# Patient Record
Sex: FEMALE | Race: WHITE | Employment: UNEMPLOYED | URBAN - METROPOLITAN AREA
[De-identification: names, ages, dates, MRNs, and addresses within clinical notes are randomized per-mention and may not be internally consistent; named-entity substitution may affect disease eponyms.]

---

## 2018-09-17 ENCOUNTER — TELEPHONE (OUTPATIENT)
Dept: CASE MANAGEMENT | Age: 58
End: 2018-09-17

## 2018-10-03 NOTE — LETTER
Date: 5/21/2025  Patient name: Zaria Monae  YOB: 1960  Medical Record Number: [id number]  Primary Coverage: Payor: [employer] / Plan: [employer] EXCHANGE / Product Type: *No Product type* /   Secondary Coverage:   Insurance ID: [id number]  Patient Address: [address] [place] [id number]  Telephone Information:   Home Phone [phone]   Mobile [phone]         Encounter Date: 5/21/2025  Provider: Kassidy David MD  Diagnosis:     ICD-10-CM   1. Non-pressure chronic ulcer of right ankle with fat layer exposed (HCC)  L97.312   2. Idiopathic chronic venous hypertension of right lower extremity with ulcer and inflammation (HCC)  I87.331   3. Right leg swelling  M79.89   4. Sloughing of wound  R23.8   5. Chronic venous hypertension involving both sides  I87.303   6. Delayed wound healing  T14.8XXD       Progress Note:  [facility] PROGRESS NOTE  KASSIDY DAVID MD  5/21/2025    Chief Complaint:   Chief Complaint   Patient presents with    Wound Recheck     Arrives to wound care follow-up with RLE in unna wrap. Reports that pain has been improving..       HPI:   Subjective   Zaria Monae is a 65 year old female coming in for a follow-up visit.    HPI    Wound stable  She is v anxious due to pain   Lot of pain after last visit .   Trying to elevate as much as possible - however has a standing job.     Tolerating wrap OK.   No s/o infection.     Review of Systems  Negative except HPI   Denies chest pain / SOB / palpitations  Denies fever.     Allergies  Allergies[1]    Current Meds:  Current Medications[2]      EXAM:     Objective   Objective    Physical Exam    Vital Signs  Vitals:    05/21/25 0700   BP: 141/80   Pulse: 87   Resp: 16   Temp: 97.6 °F (36.4 °C)       Wound Assessment  Wound 05/14/25 #1 Right Medial Ankle Ankle Right;Medial (Active)   Date First Assessed/Time First Assessed: 05/14/25 0907    Wound Number (Wound Clinic Only): #1 Right Medial Ankle   Primary Wound Type: Venous Ulcer  Location: Ankle  Wound Location Orientation: Right;Medial      Assessments 5/14/2025  9:12 AM 5/21/2025  8:10 AM   Wound Image        Drainage Amount Large Moderate   Drainage Description Serosanguineous Serosanguineous;Yellow   Treatments Compression (unna boot 30-40mmhg) --   Wound Length (cm) 2.9 cm 4 cm   Wound Width (cm) 2.5 cm 2.1 cm   Wound Surface Area (cm^2) 5.69 cm^2 6.6 cm^2   Wound Depth (cm) 0.1 cm 0.1 cm   Wound Volume (cm^3) 0.38 cm^3 0.44 cm^3   Wound Healing % -- -16   Margins Well-defined edges Well-defined edges   Non-staged Wound Description Full thickness Full thickness   Bernardo-wound Assessment Moist;Edema Edema;Moist   Wound Granulation Tissue Red;Pink;Firm Red;Pink;Spongy   Wound Bed Granulation (%) 30 % 45 %   Wound Bed Epithelium (%) 35 % 15 %   Wound Bed Slough (%) 35 % 30 %   Wound Odor None --   Shape Bridged --   Tunneling? No No   Undermining? No No   Sinus Tracts? No No       Inactive Orders   Date Order Priority Status Authorizing Provider   05/14/25 0952 Debridement Venous Ulcer Right;Medial Ankle Routine Completed Francesco Heredia MD       Compression Wrap 05/14/25 Ankle Anterior;Right (Active)   Placement Date: 05/14/25   Location: Ankle  Wound Location Orientation: Anterior;Right      Assessments 5/14/2025 10:18 AM 5/16/2025 11:00 AM   Response to Treatment Well tolerated Well tolerated   Compression Layers Multilayer Multilayer   Compression Product Type Unna Boot (30-40mmhg) Unna Boot (30-40mmHg)   Dressing Applied Yes Yes (Triad to bernardo-wound, honey-gel, honey alginate, kerramax)   Compression Wrap Location Toes to Knee Toes to Knee   Compression Wrap Status Clean;Dry;Intact Clean;Dry;Intact       No associated orders.                ASSESSMENT AND PLAN:     Assessment    Encounter Diagnosis  1. 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 Idiopathic chronic venous hypertension of right lower extremity with  ulcer and inflammation (HCC)    3. Right leg swelling    4. Sloughing of wound    5. Chronic venous hypertension involving both sides    6. Delayed wound healing      PROCEDURES:    Defer debridement due to pain and anxiety    Compression wrap application.     PLAN OF CARE:    Serial debridements to hasten healing.  Procedure not done today due to severe pain and anxiety-patient request  Discontinue use of honey gel  Start collagen/absorptive dressings  Continue compression wraps-add on Olaru Spender   Focus on edema reduction  Low-salt diet-avoid prolonged standing  Leg elevation when sitting down.  Watch out for signs of early infection - counseled.   Plan of care discussed with patient in detail - All questions answered   Return in one week.     Patient Instructions     Return one week  Vein clinic referral    Wound Cleaning and Dressings:    Shower with protection.   Dressing changes only in the clinic   DRESSINGS: collagen / HF transfer / kerramax.   Change dressing weekly     Compression Therapy : Yes,  UNNA 30-40 mm hg with add on spandagrip    Compression Therapy Instructions:  1.   Okay to wear overnight      if comfortable.      2.  Avoid prolonged standing in one place.  It is better to have your calf muscles moving       to pump fluid out of the legs.    3.  Elevate leg(s) above the level of the heart when sitting or as much as possible.    4.  Take your diuretics as directed by your provider.  Do not skip doses or change doses      unless instructed to do so by your provider.    5. Do not get leg(s) with compression wrap wet. If wraps are too tight as indicated        By pain, numbness/tingling or discoloration of toes remove wrap completely       and call the   wound center.     There are no questions and answers to display.        Off-Loading:    Miscellaneous Instructions:  Supplement with a daily multivitamin   Low salt diet  Intense blood sugar control - Goal Blood sugar below 180 at all times  recommended.  Increase protein intake / consider protein supplements - see below  Elevate extremities at all times when sitting / laying down.    DIETARY MODIFICATIONS TO HELP WITH WOUND HEALING:    Protein: Meats, beans, eggs, milk and yogurt particularly Greek yogurt), tofu, soy nuts, soy protein products    Vitamin C: Citrus fruits and juices, strawberries, tomatoes, tomato juice, peppers, baked potatoes, spinach, broccoli, cauliflower, Venus sprouts, cabbage    Vitamin A: Dark green, leafy vegetables, orange or yellow vegetables, cantaloupe, fortified dairy products, liver, fortified cereals    Zinc: Fortified cereals, red meats, seafood    Consider Izaiah by [employer] (These are essential branch chain amino acids that help with tissue building and wound healing) and take 2 packets/day. you can order online at [employer] or [employer]    ADDITIONAL REMINDERS:    The treatment plan has been discussed at length with you and your provider. Follow all instructions carefully, it is very important. If you do not follow all instructions, you are at  risk of your wound not healing, infection, possible loss of limb and even end of life.  Please call the clinic during regular business hours ( 7:30 AM - 5:30 PM) if you notice increased bleeding, redness, warmth, pain or pus like drainage or start running a fever greater than 100.3.    For after hour emergencies, please call your primary physician or go to the nearest emergency room.        Patient/Caregiver Education: There are no barriers to learning. Medical education for above diagnosis given.   Answered all questions.    Outcome: Patient verbalizes understanding. Patient is notified to call with any questions, complications, allergies, or worsening or changing symptoms.  Patient is to call with any side effects or complications as a result of the treatments today.      DOCUMENTATION OF TIME SPENT: Code selection for this visit was based on time spent : 30 min on date of  service in preparing to see the patient, obtaining and/or reviewing separately obtained history, performing a medically appropriate examination, counseling and educating the patient/family/caregiver, ordering medications or testing, referring and communicating with other healthcare providers, documenting clinical information in the E HR, independently interpreting results and communicating results to the patient/family/caregiver and care coordination with the patient's other providers.    Followup: Return in about 1 week (around 5/28/2025) for Wound followup.      Note to Patient:  The 21st Century Cures Act makes medical notes like these available to patients in the interest of transparency. However, be advised this is a medical document and is intended as xhas-pk-sqfa communication; it is written in medical language and may appear blunt, direct, or contain abbreviations or verbiage that are unfamiliar. Medical documents are intended to carry relevant information, facts as evident, and the clinical opinion of the practitioner.    Also, please note that this report has been produced using speech recognition software and may contain errors related to that system including, but not limited to, errors in grammar, punctuation, and spelling, as well as words and phrases that possibly may have been recognized inappropriately.  If there are any questions or concerns, contact the dictating provider for clarification.      Fracnesco Waldrop MD  5/21/2025  8:51 AM                      [1] No Known Allergies  [2]   Current Outpatient Medications   Medication Sig Dispense Refill    mupirocin 2 % External Ointment Apply 1 Application topically 3 (three) times daily. (Patient not taking: Reported on 5/14/2025) 15 g 1    mupirocin 2 % External Ointment Apply to affect lesions tid (Patient not taking: Reported on 5/14/2025) 30 g 1       Weekly Wound Education Note    Teaching Provided To: Patient  Training Topics: Cleasing and  general instructions, Compression, Discharge instructions, Dressing, Edema control  Training Method: Explain/Verbal  Training Response: Patient responds and understands, Reinforcement needed        Notes: Stable. Vashe soak prior to dressing application. Betamethasone and triad paste to periwound, endoform moistened with vashe to woundbed, hydrofera transfer, kerramax, and unna boot 30-40mmhg. Reminded to elevated leg and avoid prolong standing. Return in 1 week. Pt agreeable to compression garment being ordered - ordered medivan 2 layer from [employer].      WOUND CARE SUPPLIES ORDERED BELOW THIS LINE  ____________________________________________________    Wound Information/Order:  Wound Number: 1   Product: NO DRESSINGS NEEDED, ONLY COMPRESSION GARMENT    Was a Debridement performed: Yes, Debridement type: mechanical    Compression Stockings ordered: Yes   Product type: Other Juzo Ulcer Pro 30-40mmhg size: III regular and spandagrip E roll  Frequency: daily  Duration: 90 days    Calf     Point of Measurement - Right Calf: 38        Right Calf from:: Heel  Right Calf cm:: 37.5    Ankle     Point of Measurement - Right Ankle: 10           Right Ankle from:: Heel  Right Ankle cm:: 21.6       Heel to Knee   45cm       Notes: Ordering only compression garment list above, no dressings needed. Dispense as written. Please call patient before sending out order if there is any type of copay.            yes

## 2019-06-03 ENCOUNTER — PATIENT OUTREACH (OUTPATIENT)
Dept: CASE MANAGEMENT | Age: 59
End: 2019-06-03

## 2019-06-03 NOTE — PROGRESS NOTES
Pt informed is eligible for an establish care visit with PCP and offered assistance in scheduling. Patient declined.

## 2020-02-13 ENCOUNTER — OFFICE VISIT (OUTPATIENT)
Dept: FAMILY MEDICINE CLINIC | Facility: CLINIC | Age: 60
End: 2020-02-13

## 2020-02-13 DIAGNOSIS — T25.019A: Primary | ICD-10-CM

## 2020-02-13 PROCEDURE — 99203 OFFICE O/P NEW LOW 30 MIN: CPT | Performed by: NURSE PRACTITIONER

## 2020-02-14 VITALS
SYSTOLIC BLOOD PRESSURE: 124 MMHG | HEART RATE: 77 BPM | RESPIRATION RATE: 20 BRPM | HEIGHT: 68 IN | TEMPERATURE: 98 F | DIASTOLIC BLOOD PRESSURE: 82 MMHG | WEIGHT: 178 LBS | BODY MASS INDEX: 26.98 KG/M2 | OXYGEN SATURATION: 99 %

## 2020-02-14 NOTE — PROGRESS NOTES
CHIEF COMPLAINT:   Patient presents with:  Derm Problem: sore on ankle      HPI:     Johnny Rodriguez is a 64year old female who presents with concerns of sore on ankle. Patient first noticed symptoms 2 weeks ago.   Reports is on feet a lot and has varicose vein /82   Pulse 77   Temp 98 °F (36.7 °C) (Oral)   Resp 20   Ht 68\"   Wt 178 lb (80.7 kg)   SpO2 99%   BMI 27.06 kg/m²   GENERAL: well developed, well nourished,in no apparent distress  SKIN: Lesion(s): golf size open friction abrasion located right low · Wash affected area 2-3 times daily with warm soapy water. · Wash hands before and after touching area. · Apply non-stick dressing if out and about. Open to air if at home.   · Seek care if signs of infection develop, including: increased redness, swelli · Kill germs and remove the dirt by washing the wound with warm water and soap. · Soak a minor puncture wound in warm, sudsy water for several minutes. Repeat this at least 2 times every day. Step 3.  Cover the injury  · Hold the edges of a cut together w

## 2020-03-10 ENCOUNTER — OFFICE VISIT (OUTPATIENT)
Dept: FAMILY MEDICINE CLINIC | Facility: CLINIC | Age: 60
End: 2020-03-10

## 2020-03-10 VITALS
WEIGHT: 178 LBS | HEIGHT: 68 IN | SYSTOLIC BLOOD PRESSURE: 150 MMHG | BODY MASS INDEX: 26.98 KG/M2 | DIASTOLIC BLOOD PRESSURE: 89 MMHG | HEART RATE: 85 BPM

## 2020-03-10 DIAGNOSIS — L97.909 PERIPHERAL VASCULAR DISEASE OF LOWER EXTREMITY WITH ULCERATION (HCC): Primary | ICD-10-CM

## 2020-03-10 DIAGNOSIS — I73.9 PERIPHERAL VASCULAR DISEASE OF LOWER EXTREMITY WITH ULCERATION (HCC): Primary | ICD-10-CM

## 2020-03-10 DIAGNOSIS — I83.811 VARICOSE VEINS OF LEG WITH PAIN, RIGHT: ICD-10-CM

## 2020-03-10 PROCEDURE — 99203 OFFICE O/P NEW LOW 30 MIN: CPT | Performed by: NURSE PRACTITIONER

## 2020-03-10 NOTE — PATIENT INSTRUCTIONS
Varicose Veins  Varicose veins are swollen, enlarged veins most often found in the legs. They are usually blue or purple in color and may bulge, twist, and stand out under the skin. Normally, veins return blood from the body to the heart.  The leg veins · Don't sit or stand for long periods. Change positions often. Also, move your ankles, toes and knees often. This may also help improve blood flow.   · If you are overweight, talk with your healthcare provider about setting up a weight-loss plan. Maintainin · Rinse with warm water, keeping your fingers pointing down. · Use a paper towel to dry your hands and to turn off the faucet.   Remove the used dressing  Here are suggestions for removing the dressing:  · If dressing changes cause you pain, be sure to carlee · Increased swelling or pain, or redness or swelling in the skin around the wound  · A change in the color of the wound, or if streaks develop in a direction away from the wound  · The area between any stitches opens up  · An increase in the size of the wo

## 2020-03-12 NOTE — PROGRESS NOTES
HPI   Pt presents for wound to the inner aspect of right ankle. Has h/o varicose veins. Usually wears a compression home but due to wound, it is too painful.     Has never seen a vascular surgeon  Works as a  and stands a lot at [employer] shows or Social connections:        Talks on phone: Not on file        Gets together: Not on file        Attends [employer] service: Not on file        Active member of club or organization: Not on file        Attends meetings of clubs or organizations: Not on file Discussed plan of care with pt and pt is in agreement. All questions answered. Pt to call with questions or concerns. Encouraged to sign up for My Chart if not already registered.

## 2020-03-17 ENCOUNTER — APPOINTMENT (OUTPATIENT)
Dept: WOUND CARE | Facility: HOSPITAL | Age: 60
End: 2020-03-17
Attending: NURSE PRACTITIONER
Payer: COMMERCIAL

## 2020-03-31 ENCOUNTER — APPOINTMENT (OUTPATIENT)
Dept: WOUND CARE | Facility: HOSPITAL | Age: 60
End: 2020-03-31
Attending: NURSE PRACTITIONER
Payer: COMMERCIAL

## 2020-03-31 ENCOUNTER — TELEPHONE (OUTPATIENT)
Dept: FAMILY MEDICINE CLINIC | Facility: CLINIC | Age: 60
End: 2020-03-31

## 2020-03-31 NOTE — TELEPHONE ENCOUNTER
Notified by Iva LAMBERT from wound clinic that pt has cancelled twice. Message left for pt to f/u with wound care as she has significant varicose veins and I do not want her s/s worsening.  Advised to make appt w wound care and be seen,

## 2020-04-07 ENCOUNTER — OFFICE VISIT (OUTPATIENT)
Dept: WOUND CARE | Facility: HOSPITAL | Age: 60
End: 2020-04-07
Attending: NURSE PRACTITIONER
Payer: COMMERCIAL

## 2020-04-07 DIAGNOSIS — I83.811 VARICOSE VEINS OF LEG WITH PAIN, RIGHT: ICD-10-CM

## 2020-04-07 DIAGNOSIS — L97.909 PERIPHERAL VASCULAR DISEASE OF LOWER EXTREMITY WITH ULCERATION (HCC): Primary | ICD-10-CM

## 2020-04-07 DIAGNOSIS — I73.9 PERIPHERAL VASCULAR DISEASE OF LOWER EXTREMITY WITH ULCERATION (HCC): Primary | ICD-10-CM

## 2020-04-07 PROCEDURE — 99214 OFFICE O/P EST MOD 30 MIN: CPT

## 2020-04-07 PROCEDURE — 29581 APPL MULTLAYER CMPRN SYS LEG: CPT

## 2020-04-07 NOTE — PROGRESS NOTES
Subjective    Chief Complaint  This information was obtained from the patient  The patient is new to the [facility],[address] here for an initial visit for the evaluation and management of non-healing wound(s). right leg wound    Allergies  No Known Allergies    HP Cardiovascular (Central/Peripheral): Chest Pain, Palpitations  Gastrointestinal (GI): Constipation, Nausea / Vomiting / Diarrhea (N/V/D)  Genitourinary (): Urinary Incontinence  Musculoskeletal: Decreased Activity, Joint Swelling, Muscle Weakness  Neurol Wound #1 Right, Medial Ankle is a Full Thickness Venous Ulcer and has received a status of Not Healed. Initial wound encounter measurements are 2.5cm length x 0.4cm width x 0.1cm depth, with an area of 1 sq cm and a volume of 0.1 cubic cm.  No tunneling has Normal gait. no clubbing, no cyanosis of digits and nails, prominent metatarsal head with claw toes. Bilateral lower ext with no significant deformity, no joint abnormality. no edema. Peripheral pulses intact.  varicosities legs, foot and toes with hemoside I83.013: Varicose veins of right lower extremity with ulcer of ankle        Related information in the HPI.  64year old woman with extensive varicose veins and right medial leg wound:  -chronic wound greater than 2 months  -hemosiderin staining  -no edema Reviewed and evaluated labs. Discussed the plan of care at the bedside with the patient. Reviewed hospital records. I agree and attest to the above information provided from other licensed professionals.     Plan of Care:  Patient / Wound Assessment:  - Discussed leg elevation and regular exercises to manage edema in addition to compression therapy. Discussed signs and symptoms of infection, encourage patient to assess skin daily.   Discussed moisture related skin damage and keeping skin folds clean, and CNA/CHT/CMA: Rick Hurtado  Physician / Extender: Jeris Cowden  Procedure Performed for: Wound #1 Right, Medial Ankle  Performed By: Clinician Rylie De Paz RN  Response to Treatment: Procedure was tolerated well  Compression Layers: Multi-Layer  Com

## 2020-04-14 ENCOUNTER — OFFICE VISIT (OUTPATIENT)
Dept: WOUND CARE | Facility: HOSPITAL | Age: 60
End: 2020-04-14
Attending: CLINICAL NURSE SPECIALIST
Payer: COMMERCIAL

## 2020-04-14 DIAGNOSIS — L97.909 PERIPHERAL VASCULAR DISEASE OF LOWER EXTREMITY WITH ULCERATION (HCC): Primary | ICD-10-CM

## 2020-04-14 DIAGNOSIS — I83.811 VARICOSE VEINS OF LEG WITH PAIN, RIGHT: ICD-10-CM

## 2020-04-14 DIAGNOSIS — I73.9 PERIPHERAL VASCULAR DISEASE OF LOWER EXTREMITY WITH ULCERATION (HCC): Primary | ICD-10-CM

## 2020-04-14 PROCEDURE — 29581 APPL MULTLAYER CMPRN SYS LEG: CPT

## 2020-04-14 NOTE — PROGRESS NOTES
Subjective    Chief Complaint  This information was obtained from the patient  The patient was seen today for follow up and management of difficult to heal wound(s).   4/14 - no additional concerns for todays visit    Allergies  No Known Allergies    HPI  T Psychiatric: Mental Illness, Memory Loss, Suicidal        Objective    Wound Assessment(s)  Wound #1 Right, Medial Ankle is a Full Thickness Venous Ulcer and has received a status of Not Healed. Subsequent wound encounter measurements are 2.5cm length x 0. I83.013: Varicose veins of right lower extremity with ulcer of ankle        Right medial ankle/lower leg:  -currently has two openings (compare to 3 open area last week)  -patient had anxiety and fear of pain, thus unable to debride the wound due to pain i Misc / Additional orders  Supplement with a daily multivitamin. Increase dietary protein intake. Exercise as tolerated. Decrease salt intake. Stop smoking.   S/S of infection - monitor for signs and symptoms of infection such as fever, chills, redness a Ice pack applied for comfort  Not significant for charge  Electronic Signature(s)  Signed By: Date:  Mayda Lezama 04/14/2020 8:55:45 AM  Raven FERNANDOP-BC 04/14/2020 9:30:21 AM       Entered By: Gino Robin on 04/14/2020 8:52:41 AM         Header Imag

## 2020-04-21 ENCOUNTER — OFFICE VISIT (OUTPATIENT)
Dept: WOUND CARE | Facility: HOSPITAL | Age: 60
End: 2020-04-21
Attending: CLINICAL NURSE SPECIALIST
Payer: COMMERCIAL

## 2020-04-21 DIAGNOSIS — I73.9 PERIPHERAL VASCULAR DISEASE OF LOWER EXTREMITY WITH ULCERATION (HCC): Primary | ICD-10-CM

## 2020-04-21 DIAGNOSIS — L97.909 PERIPHERAL VASCULAR DISEASE OF LOWER EXTREMITY WITH ULCERATION (HCC): Primary | ICD-10-CM

## 2020-04-21 PROCEDURE — 29581 APPL MULTLAYER CMPRN SYS LEG: CPT

## 2020-04-21 NOTE — PROGRESS NOTES
Subjective    Chief Complaint  This information was obtained from the patient  The patient was seen today for follow up and management of difficult to heal right ankle wound.     Allergies  No Known Allergies    HPI  This information was obtained from the p Wound #1 Right, Medial Ankle is a Full Thickness Venous Ulcer and has received a status of Not Healed. Subsequent wound encounter measurements are 2.4cm length x 0.4cm width x 0.1cm depth, with an area of 0.96 sq cm and a volume of 0.096 cubic cm.  Shea robert I83.013: Varicose veins of right lower extremity with ulcer of ankle        Right medial leg wound:  -chronic venous insufficiency  two weeks of advanced dressing and compression therapy  -now only cluster of three open wound in the area of the wound measu Electronic Signature(s)  Signed By: Date:  Denia Velazquez FNP-BC 04/21/2020 9:11:53 AM       Entered By: Denia Velazquez on 04/21/2020 9:11:16 AM       Treatment Notes Summary  Wound #1 (Right, Medial Ankle)  . Wound Treatment Note  Assessed patient’s pa

## 2020-04-28 ENCOUNTER — OFFICE VISIT (OUTPATIENT)
Dept: WOUND CARE | Facility: HOSPITAL | Age: 60
End: 2020-04-28
Attending: CLINICAL NURSE SPECIALIST
Payer: COMMERCIAL

## 2020-04-28 DIAGNOSIS — L97.909 PERIPHERAL VASCULAR DISEASE OF LOWER EXTREMITY WITH ULCERATION (HCC): Primary | ICD-10-CM

## 2020-04-28 DIAGNOSIS — I73.9 PERIPHERAL VASCULAR DISEASE OF LOWER EXTREMITY WITH ULCERATION (HCC): Primary | ICD-10-CM

## 2020-04-28 PROCEDURE — 29581 APPL MULTLAYER CMPRN SYS LEG: CPT

## 2020-04-28 NOTE — PROGRESS NOTES
Subjective    Chief Complaint  This information was obtained from the patient  The patient was seen today for follow up and management of difficult to heal right ankle wound. Patient verbalized less pain on the wound area.     Allergies  No Known Allergies Psychiatric: Mental Illness, Memory Loss, Suicidal        Objective    Wound Assessment(s)  Wound #1 Right, Medial Ankle is a Full Thickness Venous Ulcer and has received a status of Not Healed. Subsequent wound encounter measurements are 0.3cm length x 0. Appropriate judgement and insight. Oriented to time, place and person. Appropriate mood and affect.         Assessment    Active Problems    ICD-10  (Encounter Diagnosis) I83.013 - Varicose veins of right lower extremity with ulcer of ankle    Diagnoses S/S of infection - monitor for signs and symptoms of infection such as fever, chills, redness and increased drainage and foul smell. Follow-Up Appointments:  A follow-up appointment should be scheduled.           Electronic Signature(s)  Signed By: Date:

## 2020-05-05 ENCOUNTER — OFFICE VISIT (OUTPATIENT)
Dept: WOUND CARE | Facility: HOSPITAL | Age: 60
End: 2020-05-05
Attending: CLINICAL NURSE SPECIALIST
Payer: COMMERCIAL

## 2020-05-05 DIAGNOSIS — L97.909 PERIPHERAL VASCULAR DISEASE OF LOWER EXTREMITY WITH ULCERATION (HCC): Primary | ICD-10-CM

## 2020-05-05 DIAGNOSIS — I73.9 PERIPHERAL VASCULAR DISEASE OF LOWER EXTREMITY WITH ULCERATION (HCC): Primary | ICD-10-CM

## 2020-05-05 PROCEDURE — 99213 OFFICE O/P EST LOW 20 MIN: CPT

## 2020-05-05 NOTE — PROGRESS NOTES
Subjective    Chief Complaint  This information was obtained from the patient  The patient was seen today for follow up and management of difficult to heal wound(s).   5/5 - no additional concerns    Allergies  No Known Allergies    HPI  This information wa Psychiatric: Mental Illness, Memory Loss, Suicidal        Objective    Wound Assessment(s)  Wound #1 Right, Medial Ankle is a Full Thickness Venous Ulcer and has received an outcome of Transfer of Care.  Subsequent wound encounter measurements are 0.1cm estephania I83.013: Varicose veins of right lower extremity with ulcer of ankle        Right lower leg/ankle:  -venous insufficiency and varicose veins  -less than 0.1 cm x 0.1cm  -granular wound bed  Patient stated she is willing to take over and ready to be dischar - Assess wound pain every visit, before and after procedures and after pain relief interventions. Refer non-wound related pain management to PCP or per facility policy. Status: Continued Date: 5/5/2020  - Doppler if unable to palpate pulse.   Status: Initi

## 2020-05-12 ENCOUNTER — APPOINTMENT (OUTPATIENT)
Dept: WOUND CARE | Facility: HOSPITAL | Age: 60
End: 2020-05-12
Attending: NURSE PRACTITIONER
Payer: COMMERCIAL

## 2021-06-29 ENCOUNTER — TELEPHONE (OUTPATIENT)
Dept: FAMILY MEDICINE CLINIC | Facility: CLINIC | Age: 61
End: 2021-06-29

## 2021-08-26 ENCOUNTER — TELEPHONE (OUTPATIENT)
Dept: FAMILY MEDICINE CLINIC | Facility: CLINIC | Age: 61
End: 2021-08-26

## 2025-03-25 ENCOUNTER — OFFICE VISIT (OUTPATIENT)
Dept: INTERNAL MEDICINE CLINIC | Facility: CLINIC | Age: 65
End: 2025-03-25

## 2025-03-25 VITALS
HEART RATE: 101 BPM | BODY MASS INDEX: 28.22 KG/M2 | TEMPERATURE: 97 F | WEIGHT: 186.19 LBS | HEIGHT: 68 IN | OXYGEN SATURATION: 100 % | DIASTOLIC BLOOD PRESSURE: 86 MMHG | SYSTOLIC BLOOD PRESSURE: 153 MMHG | RESPIRATION RATE: 16 BRPM

## 2025-03-25 DIAGNOSIS — L97.319 LOWER LIMB ULCER, ANKLE, RIGHT, WITH UNSPECIFIED SEVERITY (HCC): Primary | ICD-10-CM

## 2025-03-25 DIAGNOSIS — L97.909 PERIPHERAL VASCULAR DISEASE OF LOWER EXTREMITY WITH ULCERATION (HCC): ICD-10-CM

## 2025-03-25 DIAGNOSIS — I73.9 PERIPHERAL VASCULAR DISEASE OF LOWER EXTREMITY WITH ULCERATION (HCC): ICD-10-CM

## 2025-03-25 PROCEDURE — 99204 OFFICE O/P NEW MOD 45 MIN: CPT | Performed by: NURSE PRACTITIONER

## 2025-03-25 RX ORDER — MUPIROCIN 20 MG/G
1 OINTMENT TOPICAL 3 TIMES DAILY
Qty: 15 G | Refills: 1 | Status: SHIPPED | OUTPATIENT
Start: 2025-03-25

## 2025-03-25 NOTE — PATIENT INSTRUCTIONS
Wash with Soap and Water    Apply Mupirocin Three times a day with Telfa dressing and Paper Tape.    Take oral antibiotic    Return in 1 week

## 2025-03-25 NOTE — PROGRESS NOTES
HPI:    Patient ID: Zaria Monae is a 65 year old female.  [place] [facility]  Patient has two children  HPI Wound /Right Medial Ankle Ulcer.PVD  Dime size ulcer/and a smaller ulcer next to larger ulcer.  See photo in Media.  65 year old female who develop a wound on her right leg in January.  Patient states she has PVD and has had an ulcer in the same location in 2020.  She has varicose veins.    She started putting on Cream from [place]  Neosporin  She has been using cleansing spray  She started putting on Antimicrobial Silver Dressings    She is not having any pain.  She has not followed with a PCP in several years.        There is no immunization history on file for this patient.    No past medical history on file.   No past surgical history on file.   Social History     Socioeconomic History    Marital status:    Tobacco Use    Smoking status: Never    Smokeless tobacco: Never   Substance and Sexual Activity    Alcohol use: No     Alcohol/week: 0.0 standard drinks of alcohol    Drug use: No          Review of Systems   Constitutional:  Negative for chills, fatigue and fever.   HENT:  Negative for congestion, ear pain, hearing loss, sinus pain, sore throat, trouble swallowing and voice change.    Eyes:  Negative for pain and visual disturbance.   Respiratory:  Negative for cough, chest tightness and shortness of breath.    Cardiovascular:  Negative for chest pain, palpitations and leg swelling.   Gastrointestinal:  Negative for abdominal pain, constipation, diarrhea, nausea and vomiting.   Endocrine: Negative for cold intolerance and heat intolerance.   Genitourinary:  Negative for dysuria and hematuria.   Musculoskeletal:  Negative for back pain and joint swelling.   Skin:  Positive for wound. Negative for rash.   Allergic/Immunologic: Negative for environmental allergies.   Neurological:  Negative for weakness, numbness and headaches.   Hematological:  Does not bruise/bleed easily.   Psychiatric/Behavioral:   Negative for dysphoric mood and sleep disturbance. The patient is nervous/anxious.               Current Outpatient Medications   Medication Sig Dispense Refill    amoxicillin clavulanate 875-125 MG Oral Tab Take 1 tablet by mouth 2 (two) times daily for 10 days. 20 tablet 0    mupirocin 2 % External Ointment Apply 1 Application topically 3 (three) times daily. 15 g 1    mupirocin 2 % External Ointment Apply to affect lesions tid (Patient not taking: Reported on 3/25/2025) 30 g 1     Allergies:Allergies[1]   PHYSICAL EXAM:   Physical Exam  Constitutional:       Appearance: Normal appearance. She is well-developed.   HENT:      Head: Normocephalic.   Cardiovascular:      Rate and Rhythm: Normal rate and regular rhythm.      Heart sounds: Normal heart sounds. No murmur heard.     No friction rub. No gallop.   Pulmonary:      Effort: Pulmonary effort is normal. No respiratory distress.      Breath sounds: Normal breath sounds. No wheezing, rhonchi or rales.   Abdominal:      General: Bowel sounds are normal. There is no distension.      Palpations: Abdomen is soft. There is no mass.      Tenderness: There is no abdominal tenderness. There is no right CVA tenderness, left CVA tenderness or guarding.   Musculoskeletal:         General: No tenderness.      Cervical back: Normal range of motion and neck supple. No tenderness.      Right lower leg: No edema.      Left lower leg: No edema.   Lymphadenopathy:      Cervical: No cervical adenopathy.   Skin:     General: Skin is warm and dry.      Findings: No rash.      Comments: PVD- Wound  Ulcers on right medial ankle.   Neurological:      Mental Status: She is alert and oriented to person, place, and time.      Coordination: Coordination normal.      Gait: Gait normal.   Psychiatric:         Mood and Affect: Mood normal.         Behavior: Behavior normal.         Thought Content: Thought content normal.         Judgment: Judgment normal.       /86 (BP Location: Left  arm, Patient Position: Sitting, Cuff Size: adult)   Pulse 101   Temp 97.2 °F (36.2 °C) (Temporal)   Resp 16   Ht 5' 8\" (1.727 m)   Wt 186 lb 3.2 oz (84.5 kg)   SpO2 100%   BMI 28.31 kg/m²   Wt Readings from Last 2 Encounters:   03/25/25 186 lb 3.2 oz (84.5 kg)   03/10/20 178 lb (80.7 kg)     Body mass index is 28.31 kg/m².(2)  No results found for: \"WBC\", \"RBC\", \"HGB\", \"HCT\", \"MCV\", \"MCH\", \"MCHC\", \"RDW\", \"PLT\", \"MPV\"   No results found for: \"GLU\", \"BUN\", \"BUNCREA\", \"CREATSERUM\", \"ANIONGAP\", \"GFR\", \"GFRNAA\", \"GFRAA\", \"CA\", \"OSMOCALC\", \"ALKPHO\", \"AST\", \"ALT\", \"ALKPHOS\", \"BILT\", \"TP\", \"ALB\", \"GLOBULIN\", \"AGRATIO\", \"NA\", \"K\", \"CL\", \"CO2\"   No results found for: \"EAG\", \"A1C\"   No results found for: \"CHOLEST\", \"TRIG\", \"HDL\", \"LDL\", \"VLDL\", \"TCHDLRATIO\", \"NONHDLC\", \"CHOLHDLRATIO\", \"CALCNONHDL\"   No results found for: \"T4F\", \"TSH\", \"TSHT4\"             ASSESSMENT/PLAN:     Problem List Items Addressed This Visit       Peripheral vascular disease of lower extremity with ulceration (HCC)     Right Medial Ulcer- Dime size with smaller adjacent ulcer.    Plan  Wash wound daily with soap and water  Elevate as much as possible  Compression stockings during the day  Oral Abx  Mupirocin Ointment two to three times a day with Telfa dressing.    amoxicillin clavulanate 875-125 MG Oral Tab          Take 1 tablet by mouth 2 (two) times daily for 10 days., Normal, Disp-20 tablet, R-0       mupirocin 2 % External Ointment         Apply 1 Application topically 3 (three) times daily., Normal, Disp-15 g, R-1       XR ANKLE (MIN 3 VIEWS), RIGHT (CPT=73610)         [facility] - No RFL, Routine, Future, Expected: 3/25/2025 Approximate, Expires: 3/25/2026             Other Visit Diagnoses       Lower limb ulcer, ankle, right, with unspecified severity (HCC)    -  Primary    Relevant Orders    XR ANKLE (MIN 3 VIEWS), RIGHT (CPT=73610)               No orders of the defined types were placed in this encounter.      Meds This Visit:  Requested  Prescriptions     Signed Prescriptions Disp Refills    amoxicillin clavulanate 875-125 MG Oral Tab 20 tablet 0     Sig: Take 1 tablet by mouth 2 (two) times daily for 10 days.    mupirocin 2 % External Ointment 15 g 1     Sig: Apply 1 Application topically 3 (three) times daily.       Imaging & Referrals:  XR ANKLE (MIN 3 VIEWS), RIGHT (SJR=[id number])         JS Boyer          [1] No Known Allergies

## 2025-03-26 NOTE — ASSESSMENT & PLAN NOTE
Right Medial Ulcer- Dime size with smaller adjacent ulcer.    Plan  Wash wound daily with soap and water  Elevate as much as possible  Compression stockings during the day  Oral Abx  Mupirocin Ointment two to three times a day with Telfa dressing.    amoxicillin clavulanate 875-125 MG Oral Tab          Take 1 tablet by mouth 2 (two) times daily for 10 days., Normal, Disp-20 tablet, R-0       mupirocin 2 % External Ointment         Apply 1 Application topically 3 (three) times daily., Normal, Disp-15 g, R-1       XR ANKLE (MIN 3 VIEWS), RIGHT (CPT=73610)         [facility] - No RFL, Routine, Future, Expected: 3/25/2025 Approximate, Expires: 3/25/2026

## 2025-04-01 ENCOUNTER — OFFICE VISIT (OUTPATIENT)
Dept: INTERNAL MEDICINE CLINIC | Facility: CLINIC | Age: 65
End: 2025-04-01

## 2025-04-01 VITALS
BODY MASS INDEX: 28.28 KG/M2 | SYSTOLIC BLOOD PRESSURE: 139 MMHG | HEIGHT: 68 IN | HEART RATE: 96 BPM | DIASTOLIC BLOOD PRESSURE: 84 MMHG | OXYGEN SATURATION: 99 % | WEIGHT: 186.63 LBS

## 2025-04-01 DIAGNOSIS — I73.9 PERIPHERAL VASCULAR DISEASE OF LOWER EXTREMITY WITH ULCERATION (HCC): Primary | ICD-10-CM

## 2025-04-01 DIAGNOSIS — L97.909 PERIPHERAL VASCULAR DISEASE OF LOWER EXTREMITY WITH ULCERATION (HCC): Primary | ICD-10-CM

## 2025-04-01 PROCEDURE — 99214 OFFICE O/P EST MOD 30 MIN: CPT | Performed by: NURSE PRACTITIONER

## 2025-04-01 NOTE — PROGRESS NOTES
HPI:    Patient ID: Zaria Monae is a 65 year old female.    HPI Follow up  Wound /Right Medial Ankle Ulcer.PVD   65 year old female who had not seen a doctor in many years.  She had a hx of a right medial ankle ulcer in the past.  I saw her on 3/25/2025 for infected Stage 2 ulcer.  She was given Amoxicillin and Mupirocin ointment.    Wound is improving- See PHOTO. She is going out of town  She said that the mupirocin ointment is burning. She used it for about 4 days and then stopped.  She is tolerating the oral antibiotic- No diarrhea.    There is no immunization history on file for this patient.    History reviewed. No pertinent past medical history.   History reviewed. No pertinent surgical history.   Social History     Socioeconomic History    Marital status:    Tobacco Use    Smoking status: Never     Passive exposure: Never    Smokeless tobacco: Never   Vaping Use    Vaping status: Never Used   Substance and Sexual Activity    Alcohol use: Yes     Comment: wine once in awhile    Drug use: No          Review of Systems   Constitutional:  Negative for chills, fatigue and fever.   HENT:  Negative for congestion, ear discharge, ear pain, facial swelling, hearing loss, postnasal drip, sinus pressure, sore throat and trouble swallowing.    Eyes:  Negative for pain, discharge, redness and visual disturbance.   Respiratory:  Negative for cough, chest tightness, shortness of breath and wheezing.    Cardiovascular:  Negative for chest pain, palpitations and leg swelling.   Gastrointestinal:  Negative for abdominal distention, abdominal pain, constipation, diarrhea, nausea and vomiting.   Endocrine: Negative for cold intolerance, heat intolerance, polydipsia, polyphagia and polyuria.   Genitourinary:  Negative for difficulty urinating, dysuria, pelvic pain and vaginal bleeding.   Musculoskeletal:  Negative for back pain, gait problem, neck pain and neck stiffness.   Skin:  Positive for wound. Negative for color  change and rash.   Neurological:  Negative for dizziness, seizures, weakness and headaches.   Psychiatric/Behavioral:  Negative for agitation and sleep disturbance. The patient is not nervous/anxious.               Current Outpatient Medications   Medication Sig Dispense Refill    amoxicillin clavulanate 875-125 MG Oral Tab Take 1 tablet by mouth 2 (two) times daily for 10 days. 20 tablet 0    mupirocin 2 % External Ointment Apply 1 Application topically 3 (three) times daily. 15 g 1    mupirocin 2 % External Ointment Apply to affect lesions tid (Patient not taking: Reported on 3/25/2025) 30 g 1     Allergies:Allergies[1]   PHYSICAL EXAM:   Physical Exam  Constitutional:       Appearance: Normal appearance. She is well-developed.   HENT:      Head: Normocephalic.   Cardiovascular:      Rate and Rhythm: Normal rate and regular rhythm.      Heart sounds: Normal heart sounds. No murmur heard.     No friction rub. No gallop.   Pulmonary:      Effort: Pulmonary effort is normal. No respiratory distress.      Breath sounds: Normal breath sounds. No wheezing, rhonchi or rales.   Abdominal:      General: Bowel sounds are normal. There is no distension.      Palpations: Abdomen is soft. There is no mass.      Tenderness: There is no abdominal tenderness. There is no right CVA tenderness, left CVA tenderness or guarding.   Musculoskeletal:         General: No tenderness.      Cervical back: Normal range of motion and neck supple. No tenderness.      Right lower leg: No edema.      Left lower leg: No edema.   Lymphadenopathy:      Cervical: No cervical adenopathy.   Skin:     General: Skin is warm and dry.      Findings: No rash.   Neurological:      Mental Status: She is alert and oriented to person, place, and time.      Coordination: Coordination normal.      Gait: Gait normal.   Psychiatric:         Mood and Affect: Mood normal.         Behavior: Behavior normal.         Thought Content: Thought content normal.          Judgment: Judgment normal.       /84   Pulse 96   Ht 5' 8\" (1.727 m)   Wt 186 lb 9.6 oz (84.6 kg)   SpO2 99%   BMI 28.37 kg/m²   Wt Readings from Last 2 Encounters:   04/01/25 186 lb 9.6 oz (84.6 kg)   03/25/25 186 lb 3.2 oz (84.5 kg)     Body mass index is 28.37 kg/m².(2)  No results found for: \"WBC\", \"RBC\", \"HGB\", \"HCT\", \"MCV\", \"MCH\", \"MCHC\", \"RDW\", \"PLT\", \"MPV\"   No results found for: \"GLU\", \"BUN\", \"BUNCREA\", \"CREATSERUM\", \"ANIONGAP\", \"GFR\", \"GFRNAA\", \"GFRAA\", \"CA\", \"OSMOCALC\", \"ALKPHO\", \"AST\", \"ALT\", \"ALKPHOS\", \"BILT\", \"TP\", \"ALB\", \"GLOBULIN\", \"AGRATIO\", \"NA\", \"K\", \"CL\", \"CO2\"   No results found for: \"EAG\", \"A1C\"   No results found for: \"CHOLEST\", \"TRIG\", \"HDL\", \"LDL\", \"VLDL\", \"TCHDLRATIO\", \"NONHDLC\", \"CHOLHDLRATIO\", \"CALCNONHDL\"   No results found for: \"T4F\", \"TSH\", \"TSHT4\"             ASSESSMENT/PLAN:     Problem List Items Addressed This Visit       Peripheral vascular disease of lower extremity with ulceration (HCC) - Primary     Wound is improving.  Patient feeling better  She did not get the Foot X-ray ordered  Tolerating Augmentin- Mupirocin is causing a burning feeling.    Plan  She is going out of town- For her work  Will keep in touch on my[facility]- [employer]  Wash wound with soap and water daily  Trial mupirocin ointment with telfa and Kerlex    Follow up in two weeks               No orders of the defined types were placed in this encounter.      Meds This Visit:  Requested Prescriptions      No prescriptions requested or ordered in this encounter       Imaging & Referrals:  None         JS Boyer          [1] No Known Allergies

## 2025-04-01 NOTE — ASSESSMENT & PLAN NOTE
Wound is improving.  Patient feeling better  She did not get the Foot X-ray ordered  Tolerating Augmentin- Mupirocin is causing a burning feeling.    Plan  She is going out of town- For her work  Will keep in touch on [employer]- Download [employer]  Wash wound with soap and water daily  Trial mupirocin ointment with telfa and Kerlex    Follow up in two weeks

## 2025-04-30 ENCOUNTER — OFFICE VISIT (OUTPATIENT)
Dept: INTERNAL MEDICINE CLINIC | Facility: CLINIC | Age: 65
End: 2025-04-30

## 2025-04-30 VITALS
DIASTOLIC BLOOD PRESSURE: 84 MMHG | SYSTOLIC BLOOD PRESSURE: 129 MMHG | RESPIRATION RATE: 16 BRPM | BODY MASS INDEX: 28.49 KG/M2 | WEIGHT: 188 LBS | HEIGHT: 68 IN | HEART RATE: 82 BPM | TEMPERATURE: 98 F | OXYGEN SATURATION: 97 %

## 2025-04-30 DIAGNOSIS — L97.518 ULCER OF RIGHT FOOT WITH OTHER SEVERITY (HCC): Primary | ICD-10-CM

## 2025-04-30 PROCEDURE — 99214 OFFICE O/P EST MOD 30 MIN: CPT | Performed by: NURSE PRACTITIONER

## 2025-04-30 NOTE — PROGRESS NOTES
HPI:    Patient ID: Zaria Monae is a 65 year old female.    HPI Right foot Ulcer  65 year old female following up on right foot ulcer. She had ulcer in 2020 and did follow with wound clinic in the past.  Minimal improvement with antibiotic ointment and oral antibiotics.  She did not obtain the X-ray  Media Information  File Link    Photos/Colored Images - Scan on 4/30/2025 7:58 AM by Elizabeth Stephenson APRN        [facility] Information    Document ID File Type Document Type Description   [id number] Image Photos/Colored Images      Import Information    Attached At Date Time User Dept   Encounter Level 4/30/2025  7:58 AM Elizabeth Stephenson APRN [facility]     Encounter    Office Visit on 4/30/25 with Elizabeth Stephenson APRN     Media Information   Document Information    Photos/Colored Images      04/30/2025 7:58 AM   Attached To:   Office Visit on 4/30/25 with Elizabeth Stephenson APRN     Source Information    Elizabeth Stephenson APRN  [facility]   Document History               There is no immunization history on file for this patient.    Past Medical History[1]   Past Surgical History[2]   Social Hx on file[3]       Review of Systems   Constitutional:  Negative for chills, fatigue and fever.   HENT:  Negative for ear pain, hearing loss, sinus pain, sore throat and trouble swallowing.    Eyes:  Negative for pain and visual disturbance.   Respiratory:  Negative for cough, chest tightness and shortness of breath.    Cardiovascular:  Negative for chest pain, palpitations and leg swelling.   Gastrointestinal:  Negative for abdominal pain, constipation, diarrhea, nausea and vomiting.   Endocrine: Negative for cold intolerance and heat intolerance.   Genitourinary:  Negative for dysuria and hematuria.   Musculoskeletal:  Negative for back pain and joint swelling.   Skin:  Positive for wound (Right wound ulcer). Negative for rash.   Allergic/Immunologic: Negative for environmental allergies.   Neurological:  Negative for  weakness, numbness and headaches.   Hematological:  Does not bruise/bleed easily.   Psychiatric/Behavioral:  Negative for dysphoric mood and sleep disturbance. The patient is not nervous/anxious.             Current Medications[4]  Allergies:Allergies[5]   PHYSICAL EXAM:   Physical Exam  Constitutional:       Appearance: Normal appearance.   HENT:      Head: Normocephalic.      Mouth/Throat:      Mouth: Mucous membranes are moist.   Cardiovascular:      Rate and Rhythm: Normal rate and regular rhythm.   Pulmonary:      Effort: Pulmonary effort is normal.   Abdominal:      General: Abdomen is flat.   Musculoskeletal:         General: Normal range of motion.   Skin:     General: Skin is warm and dry.      Comments: Two ulcers right lower extremity- Medial aspect around ankle  They have decreased in size- See photo     Neurological:      Mental Status: She is alert and oriented to person, place, and time.   Psychiatric:         Mood and Affect: Mood normal.         Behavior: Behavior normal.         Thought Content: Thought content normal.         Judgment: Judgment normal.       /84 (BP Location: Right arm, Patient Position: Sitting, Cuff Size: adult)   Pulse 82   Temp 97.9 °F (36.6 °C) (Oral)   Resp 16   Ht 5' 8\" (1.727 m)   Wt 188 lb (85.3 kg)   SpO2 97%   BMI 28.59 kg/m²   Wt Readings from Last 2 Encounters:   04/30/25 188 lb (85.3 kg)   04/01/25 186 lb 9.6 oz (84.6 kg)     Body mass index is 28.59 kg/m².(2)  No results found for: \"WBC\", \"RBC\", \"HGB\", \"HCT\", \"MCV\", \"MCH\", \"MCHC\", \"RDW\", \"PLT\", \"MPV\"   No results found for: \"GLU\", \"BUN\", \"BUNCREA\", \"CREATSERUM\", \"ANIONGAP\", \"GFR\", \"GFRNAA\", \"GFRAA\", \"CA\", \"OSMOCALC\", \"ALKPHO\", \"AST\", \"ALT\", \"ALKPHOS\", \"BILT\", \"TP\", \"ALB\", \"GLOBULIN\", \"AGRATIO\", \"NA\", \"K\", \"CL\", \"CO2\"   No results found for: \"EAG\", \"A1C\"   No results found for: \"CHOLEST\", \"TRIG\", \"HDL\", \"LDL\", \"VLDL\", \"TCHDLRATIO\", \"NONHDLC\", \"CHOLHDLRATIO\", \"CALCNONHDL\"   No results found for: \"T4F\",  \"TSH\", \"TSHT4\"             ASSESSMENT/PLAN:     Assessment & Plan  Ulcer of right foot with other severity (HCC)  Wound #1 Right, Medial Ankle is a Full Thickness Venous Ulcer and has received a status of Not Healed.   Orders:    OP REFERRAL - WOUND CLINIC    Refer to wound clinic for possible debridement.  Wound Cleansing & Dressings    Clean wound with soap and water.    Apply telfa dressing and follow up with wound clinic    No orders of the defined types were placed in this encounter.      Meds This Visit:  Requested Prescriptions      No prescriptions requested or ordered in this encounter       Imaging & Referrals:  OP RERERRAL TO WOUND VISIT         JS Boyer          [1] No past medical history on file.  [2] No past surgical history on file.  [3]   Social History  Socioeconomic History    Marital status:    Tobacco Use    Smoking status: Never     Passive exposure: Never    Smokeless tobacco: Never   Vaping Use    Vaping status: Never Used   Substance and Sexual Activity    Alcohol use: Yes     Comment: wine once in awhile    Drug use: No   [4]   Current Outpatient Medications   Medication Sig Dispense Refill    mupirocin 2 % External Ointment Apply 1 Application topically 3 (three) times daily. (Patient not taking: Reported on 4/30/2025) 15 g 1    mupirocin 2 % External Ointment Apply to affect lesions tid (Patient not taking: Reported on 4/30/2025) 30 g 1   [5] No Known Allergies

## 2025-05-14 ENCOUNTER — OFFICE VISIT (OUTPATIENT)
Dept: WOUND CARE | Facility: HOSPITAL | Age: 65
End: 2025-05-14
Attending: INTERNAL MEDICINE
Payer: COMMERCIAL

## 2025-05-14 VITALS
DIASTOLIC BLOOD PRESSURE: 80 MMHG | HEART RATE: 88 BPM | TEMPERATURE: 98 F | SYSTOLIC BLOOD PRESSURE: 155 MMHG | RESPIRATION RATE: 16 BRPM | BODY MASS INDEX: 30.22 KG/M2 | WEIGHT: 188 LBS | HEIGHT: 66 IN

## 2025-05-14 DIAGNOSIS — M79.89 RIGHT LEG SWELLING: ICD-10-CM

## 2025-05-14 DIAGNOSIS — L97.312 NON-PRESSURE CHRONIC ULCER OF RIGHT ANKLE WITH FAT LAYER EXPOSED (HCC): Primary | ICD-10-CM

## 2025-05-14 DIAGNOSIS — T14.8XXD DELAYED WOUND HEALING: ICD-10-CM

## 2025-05-14 DIAGNOSIS — I87.331 IDIOPATHIC CHRONIC VENOUS HYPERTENSION OF RIGHT LOWER EXTREMITY WITH ULCER AND INFLAMMATION (HCC): ICD-10-CM

## 2025-05-14 DIAGNOSIS — R23.8 SLOUGHING OF WOUND: ICD-10-CM

## 2025-05-14 DIAGNOSIS — I87.303 CHRONIC VENOUS HYPERTENSION INVOLVING BOTH SIDES: ICD-10-CM

## 2025-05-14 PROCEDURE — 99214 OFFICE O/P EST MOD 30 MIN: CPT

## 2025-05-14 PROCEDURE — 29581 APPL MULTLAYER CMPRN SYS LEG: CPT

## 2025-05-14 PROCEDURE — 11042 DBRDMT SUBQ TIS 1ST 20SQCM/<: CPT | Performed by: INTERNAL MEDICINE

## 2025-05-14 NOTE — PROGRESS NOTES
Weekly Wound Education Note    Teaching Provided To: Patient  Training Topics: Cleasing and general instructions, Compression, Discharge instructions, Dressing, Edema control  Training Method: Explain/Verbal  Training Response: Patient responds and understands, Reinforcement needed        Notes: Initial visit for wound to right medial ankle. Has been opening and closing for years - recently opened 2 months ago. Had been using mupirocin and then switched to silver alginate. Vashe soak prior or dressing application. Triad paste to periwound, honey gel, honey alginate, kerramax, unna boot 30-40mmhg. Provided with information sheet on compression wrap and educated in importance. provided with carly coupon/information, increase protein and limit salt in diet. Return for RN visit in a couple of days and 1 week provider.

## 2025-05-14 NOTE — PATIENT INSTRUCTIONS
Vein clinic referral    Wound Cleaning and Dressings:    Shower with protection.   Dressing changes only in the clinic 1-2 times a week  DRESSINGS: honey/ honey alginate / kerramax.   Change dressing twice weekly     Compression Therapy : Yes,  UNNA 30-40 mm hg    Compression Therapy Instructions:  1.   Okay to wear overnight      if comfortable.      2.  Avoid prolonged standing in one place.  It is better to have your calf muscles moving       to pump fluid out of the legs.    3.  Elevate leg(s) above the level of the heart when sitting or as much as possible.    4.  Take your diuretics as directed by your provider.  Do not skip doses or change doses      unless instructed to do so by your provider.    5. Do not get leg(s) with compression wrap wet. If wraps are too tight as indicated        By pain, numbness/tingling or discoloration of toes remove wrap completely       and call the   wound center.     There are no questions and answers to display.        Off-Loading:    Miscellaneous Instructions:  Supplement with a daily multivitamin   Low salt diet  Intense blood sugar control - Goal Blood sugar below 180 at all times recommended.  Increase protein intake / consider protein supplements - see below  Elevate extremities at all times when sitting / laying down.    DIETARY MODIFICATIONS TO HELP WITH WOUND HEALING:    Protein: Meats, beans, eggs, milk and yogurt particularly Greek yogurt), tofu, soy nuts, soy protein products    Vitamin C: Citrus fruits and juices, strawberries, tomatoes, tomato juice, peppers, baked potatoes, spinach, broccoli, cauliflower, [place] sprouts, cabbage    Vitamin A: Dark green, leafy vegetables, orange or yellow vegetables, cantaloupe, fortified dairy products, liver, fortified cereals    Zinc: Fortified cereals, red meats, seafood    Consider Izaiah by [employer] (These are essential branch chain amino acids that help with tissue building and wound healing) and take 2 packets/day.  you can order online at [employer] or [employer]    ADDITIONAL REMINDERS:    The treatment plan has been discussed at length with you and your provider. Follow all instructions carefully, it is very important. If you do not follow all instructions, you are at  risk of your wound not healing, infection, possible loss of limb and even end of life.  Please call the clinic during regular business hours ( 7:30 AM - 5:30 PM) if you notice increased bleeding, redness, warmth, pain or pus like drainage or start running a fever greater than 100.3.    For after hour emergencies, please call your primary physician or go to the nearest emergency room.

## 2025-05-14 NOTE — PROGRESS NOTES
Patient ID: Zaria Monae is a 65 year old female.    Debridement Venous Ulcer Right;Medial Ankle   Wound 05/14/25 #1 Right Medial Ankle Ankle Right;Medial    Performed by: Francesco Heredia MD  Authorized by: Francesco Heredia MD      Consent   Consent obtained? verbal  Consent given by: patient  Risks discussed? procedural risks discussed    Debridement Details  Performed by: physician  Debridement type: surgical  Level of debridement: subcutaneous tissue  Pain control: lidocaine 4%  Pain control administration type: topical    Pre-debridement measurements  Length (cm): 2.9  Width (cm): 2.5  Depth (cm): 0.1  Surface Area (cm^2): 5.69    Post-debridement measurements  Length (cm): 3  Width (cm): 2.5  Depth (cm): 0.2  Percent debrided: 60%  Surface Area (cm^2): 5.89  Area Debrided (cm^2): 3.53  Volume (cm^3): 0.79    Tissue and other material debrided: subcutaneous tissue  Devitalized tissue debrided: biofilm, necrotic debris and slough  Instrument(s) utilized: curette  Comment regarding bleeding: minimal  Hemostasis obtained with: pressure  Procedural pain (0-10): 4  Post-procedural pain: 0   Response to treatment: procedure was tolerated well

## 2025-05-14 NOTE — PROGRESS NOTES
[facility] CONSULTATION NOTE  KASSIDY DAVID MD  5/14/2025    Subjective   Zaria Monae is a 65 year old female.    Chief Complaint   Patient presents with    Wound Care     Initial visit for right ankle. Has been dressing with ABT ointment first and switched to silver alginate and compression stocking 15-20mmHg. Wound has been open for about 2 months. Pt does not see a vascular doctor.      HPI    65-year-old  female here for evaluation and management of open wound on the right ankle.  She has had wounds in this area for several years on and off.  The current wound opened up few months ago and has she has been going through treatment through her PCP and has not been healing and hence was referred to us for further management.    Currently using mupirocin ointment    The recent some periwound redness which seems more like reactionary erythema-there is no malodor/purulent drainage/fever.    She does have stigmata of chronic venous hypertension including dilated varicose veins on bilateral lower extremities, spider veins, loss of hair, and skin discoloration.    Diabetes status: no    Smoker status: no    Past Medical history, Surgical history, Social history, Family history reviewed with patient.   Medications reviewed.   Epic chart notes including provider notes, labs, imaging etc. Reviewed.   [facility] care everywhere queried and results reviewed.     Past Medical Hx:  Past Medical History[1]  Past Surgical Hx:  Past Surgical History[2]  Problem List:  Problem List[3]  Social History:  Short Social Hx on File[4]  Family History:  Family History[5]  Allergies:  Allergies[6]  Current Meds:  Current Medications[7]  Tobacco Counseling:  Counseling given: Not Answered       REVIEW OF SYSTEMS:   CONSTITUTIONAL:  Denies unusual weight gain/loss, fever, chills, or fatigue.  EENT:  Eyes:  Denies eye pain, visual loss, blurred vision, double vision or yellow sclerae.   CARDIOVASCULAR:  Denies chest pain, chest  pressure, chest discomfort, palpitations, dyspnea on exertion or at rest.  RESPIRATORY:  Denies shortness of breath, wheezing, cough or sputum.  GASTROINTESTINAL:  Denies abdominal pain, nausea, vomiting, constipation, diarrhea, or blood in stool.  MUSCULOSKELETAL:  Denies weakness  NEUROLOGICAL:  Denies headache, seizures, dizziness, syncope      Objective   Objective  Physical Exam    Wound Assessment  Wound 05/14/25 #1 Right Medial Ankle Ankle Right;Medial (Active)   Date First Assessed/Time First Assessed: 05/14/25 0907    Wound Number (Wound Clinic Only): #1 Right Medial Ankle  Primary Wound Type: Venous Ulcer  Location: Ankle  Wound Location Orientation: Right;Medial      Assessments 5/14/2025  9:12 AM 5/14/2025  9:37 AM   Wound Image       Drainage Amount Large --   Drainage Description Serosanguineous --   Wound Length (cm) 2.9 cm --   Wound Width (cm) 2.5 cm --   Wound Surface Area (cm^2) 5.69 cm^2 --   Wound Depth (cm) 0.1 cm --   Wound Volume (cm^3) 0.38 cm^3 --   Margins Well-defined edges --   Non-staged Wound Description Full thickness --   Katyt-wound Assessment Moist;Edema --   Wound Granulation Tissue Red;Pink;Firm --   Wound Bed Granulation (%) 30 % --   Wound Bed Epithelium (%) 35 % --   Wound Bed Slough (%) 35 % --   Wound Odor None --   Shape Bridged --       Active Orders   Date Order Priority Status Authorizing Provider   05/14/25 0952 Debridement Venous Ulcer Right;Medial Ankle Routine Active Francesco Heredia MD               PHYSICAL EXAM:   /80   Pulse 88   Temp 97.8 °F (36.6 °C)   Resp 16  Estimated body mass index is 28.59 kg/m² as calculated from the following:    Height as of 4/30/25: 68\".    Weight as of 4/30/25: 188 lb (85.3 kg).   Vital signs reviewed.Appears stated age, well groomed.  Physical Exam:  GEN:  Patient is alert, awake and oriented, well developed, well nourished, no apparent distress.  HEENT:  Head:  Normocephalic, atraumatic   Eyes: EOMI, PERRLA, no scleral  icterus, conjunctivae clear bilaterally, no eye discharge  NECK: Supple, no CLAD, no JVD, no thyromegaly.  HEART:  Regular rate and rhythm, no murmurs, rubs or gallops.  LUNGS: Clear to auscultation bilterally, no rales/rhonchi/wheezing.  EXTREMITIES:    NEURO:  No deficit, normal gait    Assessment   Assessment    Encounter Diagnosis  1. 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 Idiopathic chronic venous hypertension of right lower extremity with ulcer and inflammation (HCC)    3. Right leg swelling    4. Delayed wound healing    5. Sloughing of wound    6. Chronic venous hypertension involving both sides        Problem List  Problem List[8]    Plan    Serial debridements to hasten healing.  Start honey gel and honey alginate for enzymatic debridement  Protect periwound with using barrier  Absorptive dressings  Start compression wraps with 30 to 40 mmHg  Counseled patient to focus on edema reduction  Low-salt diet  Avoid prolonged standing or sitting with the feet hanging  Leg elevation  Pain clinic referral given-make appointment as soon as possible for venous reflux study and consultation with the physician at the vein clinic  Return to clinic in few days for dressing change  See me back in 1 week.    PROCEDURES:     Debridement Venous Ulcer Right;Medial Ankle   Wound 05/14/25 #1 Right Medial Ankle Ankle Right;Medial     Performed by: Francesco Heredia MD  Authorized by: Francesco Heredia MD       Consent   Consent obtained? verbal  Consent given by: patient  Risks discussed? procedural risks discussed     Debridement Details  Performed by: physician  Debridement type: surgical  Level of debridement: subcutaneous tissue  Pain control: lidocaine 4%  Pain control administration type: topical     Pre-debridement measurements  Length (cm): 2.9  Width (cm): 2.5  Depth (cm): 0.1  Surface Area (cm^2): 5.69     Post-debridement measurements  Length (cm): 3  Width (cm): 2.5  Depth  (cm): 0.2  Percent debrided: 60%  Surface Area (cm^2): 5.89  Area Debrided (cm^2): 3.53  Volume (cm^3): 0.79     Tissue and other material debrided: subcutaneous tissue  Devitalized tissue debrided: biofilm, necrotic debris and slough  Instrument(s) utilized: curette  Comment regarding bleeding: minimal  Hemostasis obtained with: pressure  Procedural pain (0-10): 4  Post-procedural pain: 0   Response to treatment: procedure was tolerated well         MEDICAL NECESSSITY FOR SURGICAL DEBRIDEMENT:    Surgical debridement is necessary in this patient to stimulate and hasten the rate of wound healing by  removing biofilm and devitalized tissue, down to subcutaneous level.   Research has consistently proven that traditional bedside sharp debridement provides excellent results in reducing the square surface area of wounds. The use of surgical sharp debridement can effectively remove devitalized tissue, hence aids in achieving good wound healing and advancing the rate of wound closure,  and is a proven significant component to advancing wound closure.      Patient Instructions     Vein clinic referral    Wound Cleaning and Dressings:    Shower with protection.   Dressing changes only in the clinic 1-2 times a week  DRESSINGS: honey/ honey alginate / kerramax.   Change dressing twice weekly     Compression Therapy : Yes,  UNNA 30-40 mm hg    Compression Therapy Instructions:  1.   Okay to wear overnight      if comfortable.      2.  Avoid prolonged standing in one place.  It is better to have your calf muscles moving       to pump fluid out of the legs.    3.  Elevate leg(s) above the level of the heart when sitting or as much as possible.    4.  Take your diuretics as directed by your provider.  Do not skip doses or change doses      unless instructed to do so by your provider.    5. Do not get leg(s) with compression wrap wet. If wraps are too tight as indicated        By pain, numbness/tingling or discoloration of toes  remove wrap completely       and call the   wound center.     There are no questions and answers to display.        Off-Loading:    Miscellaneous Instructions:  Supplement with a daily multivitamin   Low salt diet  Intense blood sugar control - Goal Blood sugar below 180 at all times recommended.  Increase protein intake / consider protein supplements - see below  Elevate extremities at all times when sitting / laying down.    DIETARY MODIFICATIONS TO HELP WITH WOUND HEALING:    Protein: Meats, beans, eggs, milk and yogurt particularly Greek yogurt), tofu, soy nuts, soy protein products    Vitamin C: Citrus fruits and juices, strawberries, tomatoes, tomato juice, peppers, baked potatoes, spinach, broccoli, cauliflower, [place] sprouts, cabbage    Vitamin A: Dark green, leafy vegetables, orange or yellow vegetables, cantaloupe, fortified dairy products, liver, fortified cereals    Zinc: Fortified cereals, red meats, seafood    Consider Izaiah by [employer] (These are essential branch chain amino acids that help with tissue building and wound healing) and take 2 packets/day. you can order online at [employer] or [employer]    ADDITIONAL REMINDERS:    The treatment plan has been discussed at length with you and your provider. Follow all instructions carefully, it is very important. If you do not follow all instructions, you are at  risk of your wound not healing, infection, possible loss of limb and even end of life.  Please call the clinic during regular business hours ( 7:30 AM - 5:30 PM) if you notice increased bleeding, redness, warmth, pain or pus like drainage or start running a fever greater than 100.3.    For after hour emergencies, please call your primary physician or go to the nearest emergency room.          Orders  Orders Placed This Encounter   Procedures    Debridement Venous Ulcer Right;Medial Ankle       Patient/Caregiver Education: There are no barriers to learning. Medical education for above diagnosis  given.   Answered all questions.    Outcome: Patient verbalizes understanding. Patient is notified to call with any questions, complications, allergies, or worsening or changing symptoms.  Patient is to call with any side effects or complications as a result of the treatments today.      DOCUMENTATION OF TIME SPENT: Code selection for this visit was based on time spent : 60 min on date of service in preparing to see the patient, obtaining and/or reviewing separately obtained history, performing a medically appropriate examination, counseling and educating the patient/family/caregiver, ordering medications or testing, referring and communicating with other healthcare providers, documenting clinical information in the E HR, independently interpreting results and communicating results to the patient/family/caregiver and care coordination with the patient's other providers.    Followup: Return in about 1 week (around 5/21/2025) for Wound followup.      Note to Patient:  The 21st Century Cures Act makes medical notes like these available to patients in the interest of transparency. However, be advised this is a medical document and is intended as [id number] communication; it is written in medical language and may appear blunt, direct, or contain abbreviations or verbiage that are unfamiliar. Medical documents are intended to carry relevant information, facts as evident, and the clinical opinion of the practitioner.    Also, please note that this report has been produced using speech recognition software and may contain errors related to that system including, but not limited to, errors in grammar, punctuation, and spelling, as well as words and phrases that possibly may have been recognized inappropriately.  If there are any questions or concerns, contact the dictating provider for clarification.      Francesco Waldrop MD  5/14/2025  9:54 AM              [1] History reviewed. No pertinent past medical history.  [2]  History reviewed. No pertinent surgical history.  [3]   Patient Active Problem List  Diagnosis    Peripheral vascular disease of lower extremity with ulceration (HCC)    Varicose veins of leg with pain, right   [4]   Social History  Socioeconomic History    Marital status:    Tobacco Use    Smoking status: Never     Passive exposure: Never    Smokeless tobacco: Never   Vaping Use    Vaping status: Never Used   Substance and Sexual Activity    Alcohol use: Yes     Comment: wine once in awhile    Drug use: No     Social Drivers of Health     Food Insecurity: No Food Insecurity (3/25/2025)    NCSS - Food Insecurity     Worried About Running Out of Food in the Last Year: No     Ran Out of Food in the Last Year: No   Transportation Needs: No Transportation Needs (3/25/2025)    NCSS - Transportation     Lack of Transportation: No   Housing Stability: Not At Risk (3/25/2025)    NCSS - Housing/Utilities     Has Housing: Yes     Worried About Losing Housing: No     Unable to Get Utilities: No   [5] History reviewed. No pertinent family history.  [6] No Known Allergies  [7]   Current Outpatient Medications   Medication Sig Dispense Refill    mupirocin 2 % External Ointment Apply 1 Application topically 3 (three) times daily. (Patient not taking: Reported on 5/14/2025) 15 g 1    mupirocin 2 % External Ointment Apply to affect lesions tid (Patient not taking: Reported on 5/14/2025) 30 g 1   [8]   Patient Active Problem List  Diagnosis    Peripheral vascular disease of lower extremity with ulceration (HCC)    Varicose veins of leg with pain, right

## 2025-05-16 ENCOUNTER — OFFICE VISIT (OUTPATIENT)
Dept: WOUND CARE | Facility: HOSPITAL | Age: 65
End: 2025-05-16
Attending: INTERNAL MEDICINE
Payer: COMMERCIAL

## 2025-05-16 VITALS
SYSTOLIC BLOOD PRESSURE: 158 MMHG | RESPIRATION RATE: 18 BRPM | TEMPERATURE: 98 F | HEART RATE: 86 BPM | DIASTOLIC BLOOD PRESSURE: 94 MMHG

## 2025-05-16 DIAGNOSIS — M79.89 RIGHT LEG SWELLING: ICD-10-CM

## 2025-05-16 DIAGNOSIS — R23.8 SLOUGHING OF WOUND: ICD-10-CM

## 2025-05-16 DIAGNOSIS — T14.8XXD DELAYED WOUND HEALING: ICD-10-CM

## 2025-05-16 DIAGNOSIS — I87.331 IDIOPATHIC CHRONIC VENOUS HYPERTENSION OF RIGHT LOWER EXTREMITY WITH ULCER AND INFLAMMATION (HCC): ICD-10-CM

## 2025-05-16 DIAGNOSIS — L97.312 NON-PRESSURE CHRONIC ULCER OF RIGHT ANKLE WITH FAT LAYER EXPOSED (HCC): Primary | ICD-10-CM

## 2025-05-16 DIAGNOSIS — I87.303 CHRONIC VENOUS HYPERTENSION INVOLVING BOTH SIDES: ICD-10-CM

## 2025-05-16 PROCEDURE — 29581 APPL MULTLAYER CMPRN SYS LEG: CPT

## 2025-05-16 NOTE — PROGRESS NOTES
Chief Complaint   Patient presents with    Wound Care     Arrives for RN visit--denies new concerns. Unna-boot in place.         Medications - Current[1]    Allergies[2]       HISTORY:     Past medical, surgical, family and social history updated where appropriate.    PHYSICAL EXAM:   BP (!) 158/94   Pulse 86   Temp 98.2 °F (36.8 °C)   Resp 18        Vital signs reviewed.      Calf     Point of Measurement - Right Calf: 38        Right Calf from:: Heel  Right Calf cm:: 37.9    Ankle     Point of Measurement - Right Ankle: 10           Right Ankle from:: Heel  Right Ankle cm:: 21       Wound 05/14/25 #1 Right Medial Ankle Ankle Right;Medial (Active)   Date First Assessed/Time First Assessed: 05/14/25 0907    Wound Number (Wound Clinic Only): #1 Right Medial Ankle  Primary Wound Type: Venous Ulcer  Location: Ankle  Wound Location Orientation: Right;Medial      Assessments 5/14/2025  9:12 AM 5/16/2025 11:19 AM   Wound Image        Drainage Amount Large Moderate   Drainage Description Serosanguineous Serosanguineous   Treatments Compression --   Wound Length (cm) 2.9 cm 3.2 cm   Wound Width (cm) 2.5 cm 2 cm   Wound Surface Area (cm^2) 5.69 cm^2 5.03 cm^2   Wound Depth (cm) 0.1 cm 0.1 cm   Wound Volume (cm^3) 0.38 cm^3 0.335 cm^3   Wound Healing % -- 12   Margins Well-defined edges Well-defined edges   Non-staged Wound Description Full thickness Full thickness   Katty-wound Assessment Moist;Edema Moist;Edema   Wound Granulation Tissue Red;Pink;Firm Red;Firm   Wound Bed Granulation (%) 30 % 30 %   Wound Bed Epithelium (%) 35 % 10 %   Wound Bed Slough (%) 35 % 60 %   Wound Odor None None   Shape Bridged bridged   Tunneling? No No   Undermining? No No   Sinus Tracts? No No       Inactive Orders   Date Order Priority Status Authorizing Provider   05/14/25 0952 Debridement Venous Ulcer Right;Medial Ankle Routine Completed Francesco Heredia MD       Compression Wrap 05/14/25 Ankle Anterior;Right (Active)   Placement Date:  05/14/25   Location: Ankle  Wound Location Orientation: Anterior;Right      Assessments 5/14/2025 10:18 AM 5/16/2025 11:00 AM   Response to Treatment Well tolerated Well tolerated   Compression Layers Multilayer Multilayer   Compression Product Type Unna Boot Unna Boot   Dressing Applied Yes Yes   Compression Wrap Location Toes to Knee Toes to Knee   Compression Wrap Status Clean;Dry;Intact Clean;Dry;Intact       No associated orders.          ASSESSMENT AND PLAN:        Risks, benefits, and alternatives of current treatment plan discussed in detail.  Questions and concerns addressed. Red flags to RTC or ED reviewed.  Patient (or parent) agrees to plan.      No follow-ups on file.  Weekly Wound Education Note    Teaching Provided To: Patient  Training Topics: Cleasing and general instructions, Compression, Dressing, Edema control, Discharge instructions  Training Method: Explain/Verbal  Training Response: Patient responds and understands     Edema measurements are stable. Wound measurements are stable. Denies concerns with the unna-boot compression wrap.   Triad paste to bernardo-wound. Honey-gel, honey alginate, kerramax to wound. Calamine unna-boot 30-40mmHg placed to right leg from toes to knee. Patient has provider visit next Wednesday.                  Prabhjot SHORT RN   5/16/2025  11:24 AM             [1]   Current Outpatient Medications:     mupirocin 2 % External Ointment, Apply 1 Application topically 3 (three) times daily. (Patient not taking: Reported on 5/14/2025), Disp: 15 g, Rfl: 1    mupirocin 2 % External Ointment, Apply to affect lesions tid (Patient not taking: Reported on 5/14/2025), Disp: 30 g, Rfl: 1  [2] No Known Allergies

## 2025-05-21 ENCOUNTER — OFFICE VISIT (OUTPATIENT)
Dept: WOUND CARE | Facility: HOSPITAL | Age: 65
End: 2025-05-21
Attending: INTERNAL MEDICINE
Payer: COMMERCIAL

## 2025-05-21 VITALS
RESPIRATION RATE: 16 BRPM | DIASTOLIC BLOOD PRESSURE: 80 MMHG | HEART RATE: 87 BPM | SYSTOLIC BLOOD PRESSURE: 141 MMHG | TEMPERATURE: 98 F

## 2025-05-21 DIAGNOSIS — L97.312 NON-PRESSURE CHRONIC ULCER OF RIGHT ANKLE WITH FAT LAYER EXPOSED (HCC): Primary | ICD-10-CM

## 2025-05-21 DIAGNOSIS — M79.89 RIGHT LEG SWELLING: ICD-10-CM

## 2025-05-21 DIAGNOSIS — R23.8 SLOUGHING OF WOUND: ICD-10-CM

## 2025-05-21 DIAGNOSIS — T14.8XXD DELAYED WOUND HEALING: ICD-10-CM

## 2025-05-21 DIAGNOSIS — I87.303 CHRONIC VENOUS HYPERTENSION INVOLVING BOTH SIDES: ICD-10-CM

## 2025-05-21 DIAGNOSIS — I87.331 IDIOPATHIC CHRONIC VENOUS HYPERTENSION OF RIGHT LOWER EXTREMITY WITH ULCER AND INFLAMMATION (HCC): ICD-10-CM

## 2025-05-21 PROCEDURE — 29581 APPL MULTLAYER CMPRN SYS LEG: CPT

## 2025-05-21 NOTE — PROGRESS NOTES
Weekly Wound Education Note    Teaching Provided To: Patient  Training Topics: Cleasing and general instructions, Compression, Discharge instructions, Dressing, Edema control  Training Method: Explain/Verbal  Training Response: Patient responds and understands, Reinforcement needed        Notes: Stable. Vashe soak prior to dressing application. Betamethasone and triad paste to periwound, endoform moistened with vashe to woundbed, hydrofera transfer, kerramax, and unna boot 30-40mmhg. Reminded to elevated leg and avoid prolong standing. Return in 1 week. Pt agreeable to compression garment being ordered - ordered medivan 2 layer from [employer].

## 2025-05-21 NOTE — PROGRESS NOTES
[facility] PROGRESS NOTE  KASSIDY DAVID MD  5/21/2025    Chief Complaint:   Chief Complaint   Patient presents with    Wound Recheck     Arrives to wound care follow-up with RLE in unna wrap. Reports that pain has been improving..       HPI:   Subjective   Zaria Monae is a 65 year old female coming in for a follow-up visit.    HPI    Wound stable  She is v anxious due to pain   Lot of pain after last visit .   Trying to elevate as much as possible - however has a standing job.     Tolerating wrap OK.   No s/o infection.     Review of Systems  Negative except HPI   Denies chest pain / SOB / palpitations  Denies fever.     Allergies  Allergies[1]    Current Meds:  Current Medications[2]      EXAM:     Objective   Objective    Physical Exam    Vital Signs  Vitals:    05/21/25 0700   BP: 141/80   Pulse: 87   Resp: 16   Temp: 97.6 °F (36.4 °C)       Wound Assessment  Wound 05/14/25 #1 Right Medial Ankle Ankle Right;Medial (Active)   Date First Assessed/Time First Assessed: 05/14/25 0907    Wound Number (Wound Clinic Only): #1 Right Medial Ankle  Primary Wound Type: Venous Ulcer  Location: Ankle  Wound Location Orientation: Right;Medial      Assessments 5/14/2025  9:12 AM 5/21/2025  8:10 AM   Wound Image        Drainage Amount Large Moderate   Drainage Description Serosanguineous Serosanguineous;Yellow   Treatments Compression (unna boot 30-40mmhg) --   Wound Length (cm) 2.9 cm 4 cm   Wound Width (cm) 2.5 cm 2.1 cm   Wound Surface Area (cm^2) 5.69 cm^2 6.6 cm^2   Wound Depth (cm) 0.1 cm 0.1 cm   Wound Volume (cm^3) 0.38 cm^3 0.44 cm^3   Wound Healing % -- -16   Margins Well-defined edges Well-defined edges   Non-staged Wound Description Full thickness Full thickness   Katty-wound Assessment Moist;Edema Edema;Moist   Wound Granulation Tissue Red;Pink;Firm Red;Pink;Spongy   Wound Bed Granulation (%) 30 % 45 %   Wound Bed Epithelium (%) 35 % 15 %   Wound Bed Slough (%) 35 % 30 %   Wound Odor None --   Shape Bridged  --   Tunneling? No No   Undermining? No No   Sinus Tracts? No No       Inactive Orders   Date Order Priority Status Authorizing Provider   05/14/25 0952 Debridement Venous Ulcer Right;Medial Ankle Routine Completed Francesco Heredia MD       Compression Wrap 05/14/25 Ankle Anterior;Right (Active)   Placement Date: 05/14/25   Location: Ankle  Wound Location Orientation: Anterior;Right      Assessments 5/14/2025 10:18 AM 5/16/2025 11:00 AM   Response to Treatment Well tolerated Well tolerated   Compression Layers Multilayer Multilayer   Compression Product Type Unna Boot (30-40mmhg) Unna Boot (30-40mmHg)   Dressing Applied Yes Yes (Triad to bernardo-wound, honey-gel, honey alginate, kerramax)   Compression Wrap Location Toes to Knee Toes to Knee   Compression Wrap Status Clean;Dry;Intact Clean;Dry;Intact       No associated orders.                ASSESSMENT AND PLAN:     Assessment     Encounter Diagnosis  1. 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 Idiopathic chronic venous hypertension of right lower extremity with ulcer and inflammation (HCC)    3. Right leg swelling    4. Sloughing of wound    5. Chronic venous hypertension involving both sides    6. Delayed wound healing      PROCEDURES:    Defer debridement due to pain and anxiety    Compression wrap application.     PLAN OF CARE:    Serial debridements to hasten healing.  Procedure not done today due to severe pain and anxiety-patient request  Discontinue use of honey gel  Start collagen/absorptive dressings  Continue compression wraps-add on Olaru Spender   Focus on edema reduction  Low-salt diet-avoid prolonged standing  Leg elevation when sitting down.  Watch out for signs of early infection - counseled.   Plan of care discussed with patient in detail - All questions answered   Return in one week.     Patient Instructions     Return one week  Vein clinic referral    Wound Cleaning and Dressings:    Shower with  protection.   Dressing changes only in the clinic   DRESSINGS: collagen / HF transfer / kerramax.   Change dressing weekly     Compression Therapy : Yes,  UNNA 30-40 mm hg with add on spandagrip    Compression Therapy Instructions:  1.   Okay to wear overnight      if comfortable.      2.  Avoid prolonged standing in one place.  It is better to have your calf muscles moving       to pump fluid out of the legs.    3.  Elevate leg(s) above the level of the heart when sitting or as much as possible.    4.  Take your diuretics as directed by your provider.  Do not skip doses or change doses      unless instructed to do so by your provider.    5. Do not get leg(s) with compression wrap wet. If wraps are too tight as indicated        By pain, numbness/tingling or discoloration of toes remove wrap completely       and call the   wound center.     There are no questions and answers to display.        Off-Loading:    Miscellaneous Instructions:  Supplement with a daily multivitamin   Low salt diet  Intense blood sugar control - Goal Blood sugar below 180 at all times recommended.  Increase protein intake / consider protein supplements - see below  Elevate extremities at all times when sitting / laying down.    DIETARY MODIFICATIONS TO HELP WITH WOUND HEALING:    Protein: Meats, beans, eggs, milk and yogurt particularly Greek yogurt), tofu, soy nuts, soy protein products    Vitamin C: Citrus fruits and juices, strawberries, tomatoes, tomato juice, peppers, baked potatoes, spinach, broccoli, cauliflower, [place] sprouts, cabbage    Vitamin A: Dark green, leafy vegetables, orange or yellow vegetables, cantaloupe, fortified dairy products, liver, fortified cereals    Zinc: Fortified cereals, red meats, seafood    Consider Izaiah by [employer] (These are essential branch chain amino acids that help with tissue building and wound healing) and take 2 packets/day. you can order online at [employer] or [employer]  REMINDERS:    The treatment plan has been discussed at length with you and your provider. Follow all instructions carefully, it is very important. If you do not follow all instructions, you are at  risk of your wound not healing, infection, possible loss of limb and even end of life.  Please call the clinic during regular business hours ( 7:30 AM - 5:30 PM) if you notice increased bleeding, redness, warmth, pain or pus like drainage or start running a fever greater than 100.3.    For after hour emergencies, please call your primary physician or go to the nearest emergency room.        Patient/Caregiver Education: There are no barriers to learning. Medical education for above diagnosis given.   Answered all questions.    Outcome: Patient verbalizes understanding. Patient is notified to call with any questions, complications, allergies, or worsening or changing symptoms.  Patient is to call with any side effects or complications as a result of the treatments today.      DOCUMENTATION OF TIME SPENT: Code selection for this visit was based on time spent : 30 min on date of service in preparing to see the patient, obtaining and/or reviewing separately obtained history, performing a medically appropriate examination, counseling and educating the patient/family/caregiver, ordering medications or testing, referring and communicating with other healthcare providers, documenting clinical information in the E HR, independently interpreting results and communicating results to the patient/family/caregiver and care coordination with the patient's other providers.    Followup: Return in about 1 week (around 5/28/2025) for Wound followup.      Note to Patient:  The 21st Century Cures Act makes medical notes like these available to patients in the interest of transparency. However, be advised this is a medical document and is intended as dwfe-ks-sstr communication; it is written in medical language and may appear blunt, direct, or  contain abbreviations or verbiage that are unfamiliar. Medical documents are intended to carry relevant information, facts as evident, and the clinical opinion of the practitioner.    Also, please note that this report has been produced using speech recognition software and may contain errors related to that system including, but not limited to, errors in grammar, punctuation, and spelling, as well as words and phrases that possibly may have been recognized inappropriately.  If there are any questions or concerns, contact the dictating provider for clarification.      Francesco Waldrop MD  5/21/2025  8:51 AM                      [1] No Known Allergies  [2]   Current Outpatient Medications   Medication Sig Dispense Refill    mupirocin 2 % External Ointment Apply 1 Application topically 3 (three) times daily. (Patient not taking: Reported on 5/14/2025) 15 g 1    mupirocin 2 % External Ointment Apply to affect lesions tid (Patient not taking: Reported on 5/14/2025) 30 g 1

## 2025-05-21 NOTE — PATIENT INSTRUCTIONS
Return one week  Vein clinic referral    Wound Cleaning and Dressings:    Shower with protection.   Dressing changes only in the clinic   DRESSINGS: collagen / HF transfer / kerramax.   Change dressing weekly     Compression Therapy : Yes,  UNNA 30-40 mm hg with add on spandagrip    Compression Therapy Instructions:  1.   Okay to wear overnight      if comfortable.      2.  Avoid prolonged standing in one place.  It is better to have your calf muscles moving       to pump fluid out of the legs.    3.  Elevate leg(s) above the level of the heart when sitting or as much as possible.    4.  Take your diuretics as directed by your provider.  Do not skip doses or change doses      unless instructed to do so by your provider.    5. Do not get leg(s) with compression wrap wet. If wraps are too tight as indicated        By pain, numbness/tingling or discoloration of toes remove wrap completely       and call the   wound center.     There are no questions and answers to display.        Off-Loading:    Miscellaneous Instructions:  Supplement with a daily multivitamin   Low salt diet  Intense blood sugar control - Goal Blood sugar below 180 at all times recommended.  Increase protein intake / consider protein supplements - see below  Elevate extremities at all times when sitting / laying down.    DIETARY MODIFICATIONS TO HELP WITH WOUND HEALING:    Protein: Meats, beans, eggs, milk and yogurt particularly Greek yogurt), tofu, soy nuts, soy protein products    Vitamin C: Citrus fruits and juices, strawberries, tomatoes, tomato juice, peppers, baked potatoes, spinach, broccoli, cauliflower, [place] sprouts, cabbage    Vitamin A: Dark green, leafy vegetables, orange or yellow vegetables, cantaloupe, fortified dairy products, liver, fortified cereals    Zinc: Fortified cereals, red meats, seafood    Consider Izaiah by [employer] (These are essential branch chain amino acids that help with tissue building and wound healing) and  take 2 packets/day. you can order online at [employer] or [employer]    ADDITIONAL REMINDERS:    The treatment plan has been discussed at length with you and your provider. Follow all instructions carefully, it is very important. If you do not follow all instructions, you are at  risk of your wound not healing, infection, possible loss of limb and even end of life.  Please call the clinic during regular business hours ( 7:30 AM - 5:30 PM) if you notice increased bleeding, redness, warmth, pain or pus like drainage or start running a fever greater than 100.3.    For after hour emergencies, please call your primary physician or go to the nearest emergency room.

## 2025-05-28 ENCOUNTER — TELEPHONE (OUTPATIENT)
Dept: INTERNAL MEDICINE CLINIC | Facility: CLINIC | Age: 65
End: 2025-05-28

## 2025-05-28 ENCOUNTER — OFFICE VISIT (OUTPATIENT)
Dept: WOUND CARE | Facility: HOSPITAL | Age: 65
End: 2025-05-28
Attending: INTERNAL MEDICINE
Payer: COMMERCIAL

## 2025-05-28 VITALS
RESPIRATION RATE: 16 BRPM | TEMPERATURE: 98 F | DIASTOLIC BLOOD PRESSURE: 79 MMHG | HEART RATE: 90 BPM | SYSTOLIC BLOOD PRESSURE: 148 MMHG

## 2025-05-28 DIAGNOSIS — L97.312 NON-PRESSURE CHRONIC ULCER OF RIGHT ANKLE WITH FAT LAYER EXPOSED (HCC): Primary | ICD-10-CM

## 2025-05-28 DIAGNOSIS — L97.518 ULCER OF RIGHT FOOT WITH OTHER SEVERITY (HCC): Primary | ICD-10-CM

## 2025-05-28 DIAGNOSIS — M79.89 RIGHT LEG SWELLING: ICD-10-CM

## 2025-05-28 DIAGNOSIS — T14.8XXD DELAYED WOUND HEALING: ICD-10-CM

## 2025-05-28 DIAGNOSIS — R23.8 SLOUGHING OF WOUND: ICD-10-CM

## 2025-05-28 DIAGNOSIS — I87.303 CHRONIC VENOUS HYPERTENSION INVOLVING BOTH SIDES: ICD-10-CM

## 2025-05-28 DIAGNOSIS — I87.331 IDIOPATHIC CHRONIC VENOUS HYPERTENSION OF RIGHT LOWER EXTREMITY WITH ULCER AND INFLAMMATION (HCC): ICD-10-CM

## 2025-05-28 PROCEDURE — 97597 DBRDMT OPN WND 1ST 20 CM/<: CPT | Performed by: INTERNAL MEDICINE

## 2025-05-28 NOTE — PROGRESS NOTES
[facility] PROGRESS NOTE  KASSIDY DAVID MD  5/28/2025    Chief Complaint:   Chief Complaint   Patient presents with    Wound Care     Patients is here for a follow up. Patients stated no issue at this moment        HPI:   Subjective   Zaria Monae is a 65 year old female coming in for a follow-up visit.    HPI    Wound looking good.  Dimensions are slightly smaller.  Wound base is covered with granulation, slough, advancing epithelium.    She has not made appointment with vein clinic yet.    Continues to have pain during debridement.    No signs of infection    Tolerating compression wraps  okay.    Review of Systems  Negative except HPI   Denies chest pain / SOB / palpitations  Denies fever.     Allergies  Allergies[1]    Current Meds:  Current Medications[2]      EXAM:     Objective   Objective    Physical Exam    Vital Signs  Vitals:    05/28/25 0756   BP: 148/79   Pulse: 90   Resp: 16   Temp: 97.8 °F (36.6 °C)       Wound Assessment  Wound 05/14/25 #1 Right Medial Ankle Ankle Right;Medial (Active)   Date First Assessed/Time First Assessed: 05/14/25 0907    Wound Number (Wound Clinic Only): #1 Right Medial Ankle  Primary Wound Type: Venous Ulcer  Location: Ankle  Wound Location Orientation: Right;Medial      Assessments 5/14/2025  9:12 AM 5/28/2025  7:55 AM   Wound Image         Drainage Amount Large Small   Drainage Description Serosanguineous Serosanguineous   Treatments Compression (unna boot 30-40mmhg) Compression (unna boot 30-40mmhg)   Wound Length (cm) 2.9 cm 3.7 cm   Wound Width (cm) 2.5 cm 2.5 cm   Wound Surface Area (cm^2) 5.69 cm^2 7.26 cm^2   Wound Depth (cm) 0.1 cm 0.1 cm   Wound Volume (cm^3) 0.38 cm^3 0.484 cm^3   Wound Healing % -- -27   Margins Well-defined edges Well-defined edges   Non-staged Wound Description Full thickness Full thickness   Katty-wound Assessment Moist;Edema Edema   Wound Granulation Tissue Red;Pink;Firm Firm;Red;Pink   Wound Bed Granulation (%) 30 % 10 %   Wound Bed  Epithelium (%) 35 % 5 %   Wound Bed Slough (%) 35 % 85 %   Wound Odor None None   Shape Bridged bridged   Tunneling? No No   Undermining? No No   Sinus Tracts? No No       Active Orders   Date Order Priority Status Authorizing Provider   05/28/25 0842 Debridement Venous Ulcer Right;Medial Ankle Routine Active Francesco Heredia MD       Inactive Orders   Date Order Priority Status Authorizing Provider   05/14/25 0952 Debridement Venous Ulcer Right;Medial Ankle Routine Completed Francesco Heredia MD       Compression Wrap 05/14/25 Ankle Anterior;Right (Active)   Placement Date: 05/14/25   Location: Ankle  Wound Location Orientation: Anterior;Right      Assessments 5/14/2025 10:18 AM 5/28/2025  8:47 AM   Response to Treatment Well tolerated Well tolerated   Compression Layers Multilayer Multilayer   Compression Product Type Unna Boot (30-40mmhg) Unna Boot (30-40mmhg)   Dressing Applied Yes Yes   Compression Wrap Location Toes to Knee Toes to Knee   Compression Wrap Status Clean;Dry;Intact Clean;Dry;Intact       No associated orders.                ASSESSMENT AND PLAN:     Assessment     Encounter Diagnosis  1. 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 Idiopathic chronic venous hypertension of right lower extremity with ulcer and inflammation (HCC)    3. Right leg swelling    4. Sloughing of wound    5. Chronic venous hypertension involving both sides    6. Delayed wound healing            PROCEDURES:    Debridement Venous Ulcer Right;Medial Ankle   Wound 05/14/25 #1 Right Medial Ankle Ankle Right;Medial     Performed by: Francesco Heredia MD  Authorized by: Francesco Heredia MD       Consent   Consent obtained? verbal  Consent given by: patient  Risks discussed? procedural risks discussed     Debridement Details  Performed by: physician  Debridement type: conservative sharp  Pain control: lidocaine 4%  Pain control administration type: topical     Pre-debridement  measurements  Length (cm): 3.7  Width (cm): 2.5  Depth (cm): 0.1  Surface Area (cm^2): 7.26     Post-debridement measurements  Length (cm): 3.7  Width (cm): 2.5  Depth (cm): 0.1  Percent debrided: 60%  Surface Area (cm^2): 7.26  Area Debrided (cm^2): 4.36  Volume (cm^3): 0.48     Devitalized tissue debrided: biofilm and slough  Instrument(s) utilized: curette  Comment regarding bleeding: minimal  Hemostasis obtained with: pressure  Procedural pain (0-10): 5  Post-procedural pain: 1   Response to treatment: procedure was tolerated well                                 PLAN OF CARE:    Vashe soak prior to dressing application. Betamethasone/triad paste to periwound, raheem, hydrofera transfer, kerramax, and unna boot 30-40mmhg.   Pt brought in DME  from [employer] - only spandagrip - message sent to [employer] rep as they did not send the velcro compression also listed on the shipping list.   Watch out for signs of early infection - counseled.   Plan of care discussed with patient in detail - All questions answered   Return in one week.     Orders  Orders Placed This Encounter   Procedures    Debridement Venous Ulcer Right;Medial Ankle         Patient Instructions     Return one weekfor nurse visit  See me in 2 weeks  PA for skin subs  Schedule appt with vein clinic.     Wound Cleaning and Dressings:    Shower with protection.   Dressing changes only in the clinic   DRESSINGS: collagen / HF transfer / kerramax.   Change dressing weekly     Compression Therapy : Yes,  UNNA 30-40 mm hg with add on spandagrip    Compression Therapy Instructions:  1.   Okay to wear overnight      if comfortable.      2.  Avoid prolonged standing in one place.  It is better to have your calf muscles moving       to pump fluid out of the legs.    3.  Elevate leg(s) above the level of the heart when sitting or as much as possible.    4.  Take your diuretics as directed by your provider.  Do not skip doses or change doses      unless instructed to do so  by your provider.    5. Do not get leg(s) with compression wrap wet. If wraps are too tight as indicated        By pain, numbness/tingling or discoloration of toes remove wrap completely       and call the   wound center.     There are no questions and answers to display.        Off-Loading:    Miscellaneous Instructions:  Supplement with a daily multivitamin   Low salt diet  Intense blood sugar control - Goal Blood sugar below 180 at all times recommended.  Increase protein intake / consider protein supplements - see below  Elevate extremities at all times when sitting / laying down.    DIETARY MODIFICATIONS TO HELP WITH WOUND HEALING:    Protein: Meats, beans, eggs, milk and yogurt particularly Greek yogurt), tofu, soy nuts, soy protein products    Vitamin C: Citrus fruits and juices, strawberries, tomatoes, tomato juice, peppers, baked potatoes, spinach, broccoli, cauliflower, [place] sprouts, cabbage    Vitamin A: Dark green, leafy vegetables, orange or yellow vegetables, cantaloupe, fortified dairy products, liver, fortified cereals    Zinc: Fortified cereals, red meats, seafood    Consider Izaiah by [employer] (These are essential branch chain amino acids that help with tissue building and wound healing) and take 2 packets/day. you can order online at [employer] or [employer]    ADDITIONAL REMINDERS:    The treatment plan has been discussed at length with you and your provider. Follow all instructions carefully, it is very important. If you do not follow all instructions, you are at  risk of your wound not healing, infection, possible loss of limb and even end of life.  Please call the clinic during regular business hours ( 7:30 AM - 5:30 PM) if you notice increased bleeding, redness, warmth, pain or pus like drainage or start running a fever greater than 100.3.    For after hour emergencies, please call your primary physician or go to the nearest emergency room.    Patient/Caregiver Education: There are no barriers  to learning. Medical education for above diagnosis given.   Answered all questions.    Outcome: Patient verbalizes understanding. Patient is notified to call with any questions, complications, allergies, or worsening or changing symptoms.  Patient is to call with any side effects or complications as a result of the treatments today.      DOCUMENTATION OF TIME SPENT: Code selection for this visit was based on time spent : 35 min on date of service in preparing to see the patient, obtaining and/or reviewing separately obtained history, performing a medically appropriate examination, counseling and educating the patient/family/caregiver, ordering medications or testing, referring and communicating with other healthcare providers, documenting clinical information in the E HR, independently interpreting results and communicating results to the patient/family/caregiver and care coordination with the patient's other providers.    Followup: Return in about 2 weeks (around 6/11/2025) for Wound followup.      Note to Patient:  The 21st Century Cures Act makes medical notes like these available to patients in the interest of transparency. However, be advised this is a medical document and is intended as cwho-if-eyuz communication; it is written in medical language and may appear blunt, direct, or contain abbreviations or verbiage that are unfamiliar. Medical documents are intended to carry relevant information, facts as evident, and the clinical opinion of the practitioner.    Also, please note that this report has been produced using speech recognition software and may contain errors related to that system including, but not limited to, errors in grammar, punctuation, and spelling, as well as words and phrases that possibly may have been recognized inappropriately.  If there are any questions or concerns, contact the dictating provider for clarification.      Francesco Waldrop MD  5/28/2025  8:24 AM                      [1]  No Known Allergies  [2]   Current Outpatient Medications   Medication Sig Dispense Refill    mupirocin 2 % External Ointment Apply 1 Application topically 3 (three) times daily. (Patient not taking: Reported on 5/14/2025) 15 g 1    mupirocin 2 % External Ointment Apply to affect lesions tid (Patient not taking: Reported on 5/14/2025) 30 g 1

## 2025-05-28 NOTE — PROGRESS NOTES
Patient ID: Zaria Monae is a 65 year old female.    Debridement Venous Ulcer Right;Medial Ankle   Wound 05/14/25 #1 Right Medial Ankle Ankle Right;Medial    Performed by: Francesco Heredia MD  Authorized by: Francesco Heredia MD      Consent   Consent obtained? verbal  Consent given by: patient  Risks discussed? procedural risks discussed    Debridement Details  Performed by: physician  Debridement type: conservative sharp  Pain control: lidocaine 4%  Pain control administration type: topical    Pre-debridement measurements  Length (cm): 3.7  Width (cm): 2.5  Depth (cm): 0.1  Surface Area (cm^2): 7.26    Post-debridement measurements  Length (cm): 3.7  Width (cm): 2.5  Depth (cm): 0.1  Percent debrided: 60%  Surface Area (cm^2): 7.26  Area Debrided (cm^2): 4.36  Volume (cm^3): 0.48    Devitalized tissue debrided: biofilm and slough  Instrument(s) utilized: curette  Comment regarding bleeding: minimal  Hemostasis obtained with: pressure  Procedural pain (0-10): 5  Post-procedural pain: 1   Response to treatment: procedure was tolerated well

## 2025-05-28 NOTE — TELEPHONE ENCOUNTER
Patient is requesting a new referral for additional 10 visits for Wound Care.    Patient was advised to request 10 additional visits.      Name of specialist and specialty department :  Dr. Francesco Waldrop, Internal Med. Wound care    Reason for visit with the specialist:  wound care right foot    Address of the specialist office:  [facility]    Appointment date:  none       PSR informed patient the turnaround time for referral is 5-7 business days.  Patient was informed to check their [employer] account for referral status.

## 2025-05-28 NOTE — PROGRESS NOTES
Weekly Wound Education Note    Teaching Provided To: Patient  Training Topics: Cleasing and general instructions, Compression, Discharge instructions, Dressing, Edema control  Training Method: Explain/Verbal  Training Response: Patient responds and understands, Reinforcement needed        Notes: Stable. Vashe soak prior to dressing application. Betamethasone/triad paste to periwound, raheem, hydrofera transfer, kerramax, and unna boot 30-40mmhg. Pt brought in to sent to her from [employer]  only spandagrip - message sent to [employer] rep as they did not send the velcro compression also listed on the shipping list.

## 2025-05-28 NOTE — PATIENT INSTRUCTIONS
Return one weekfor nurse visit  See me in 2 weeks  PA for skin subs  Schedule appt with vein clinic.     Wound Cleaning and Dressings:    Shower with protection.   Dressing changes only in the clinic   DRESSINGS: collagen / HF transfer / kerramax.   Change dressing weekly     Compression Therapy : Yes,  UNNA 30-40 mm hg with add on spandagrip    Compression Therapy Instructions:  1.   Okay to wear overnight      if comfortable.      2.  Avoid prolonged standing in one place.  It is better to have your calf muscles moving       to pump fluid out of the legs.    3.  Elevate leg(s) above the level of the heart when sitting or as much as possible.    4.  Take your diuretics as directed by your provider.  Do not skip doses or change doses      unless instructed to do so by your provider.    5. Do not get leg(s) with compression wrap wet. If wraps are too tight as indicated        By pain, numbness/tingling or discoloration of toes remove wrap completely       and call the   wound center.     There are no questions and answers to display.        Off-Loading:    Miscellaneous Instructions:  Supplement with a daily multivitamin   Low salt diet  Intense blood sugar control - Goal Blood sugar below 180 at all times recommended.  Increase protein intake / consider protein supplements - see below  Elevate extremities at all times when sitting / laying down.    DIETARY MODIFICATIONS TO HELP WITH WOUND HEALING:    Protein: Meats, beans, eggs, milk and yogurt particularly Greek yogurt), tofu, soy nuts, soy protein products    Vitamin C: Citrus fruits and juices, strawberries, tomatoes, tomato juice, peppers, baked potatoes, spinach, broccoli, cauliflower, [place] sprouts, cabbage    Vitamin A: Dark green, leafy vegetables, orange or yellow vegetables, cantaloupe, fortified dairy products, liver, fortified cereals    Zinc: Fortified cereals, red meats, seafood    Consider Izaiah by [employer] (These are essential branch chain  amino acids that help with tissue building and wound healing) and take 2 packets/day. you can order online at [employer] or [employer]    ADDITIONAL REMINDERS:    The treatment plan has been discussed at length with you and your provider. Follow all instructions carefully, it is very important. If you do not follow all instructions, you are at  risk of your wound not healing, infection, possible loss of limb and even end of life.  Please call the clinic during regular business hours ( 7:30 AM - 5:30 PM) if you notice increased bleeding, redness, warmth, pain or pus like drainage or start running a fever greater than 100.3.    For after hour emergencies, please call your primary physician or go to the nearest emergency room.

## 2025-05-30 ENCOUNTER — TELEPHONE (OUTPATIENT)
Dept: INTERNAL MEDICINE CLINIC | Facility: CLINIC | Age: 65
End: 2025-05-30

## 2025-05-30 NOTE — TELEPHONE ENCOUNTER
Contacted patient. She states she spoke with the wound clinic again today and they told her everything is okay insurance. Nothing needed at this time.

## 2025-05-30 NOTE — TELEPHONE ENCOUNTER
Patient stopped by and is requesting a phone call from Elizabeth Stephenson in regards to wound care clinic appts.  She was advised to have Anton call [facility] at [phone].  She has an upcoming appt on Wednesday 6/04/25 but unsure if she should go due to insurance is not covering appts.

## 2025-05-30 NOTE — TELEPHONE ENCOUNTER
Please call patient and inform her that she has to call her insurance company and find out which wound clinic they cover.    Elizabeth Stephenson, DNP

## 2025-06-02 NOTE — TELEPHONE ENCOUNTER
Dr. Stephenson,     Patient called requesting referral to Dr. Waldrop for an additional 10 visits.     Pended referral please review diagnosis and sign off if you agree.    Thank you.  Cecilia Smart  [facility]

## 2025-06-04 ENCOUNTER — OFFICE VISIT (OUTPATIENT)
Dept: WOUND CARE | Facility: HOSPITAL | Age: 65
End: 2025-06-04
Attending: INTERNAL MEDICINE
Payer: COMMERCIAL

## 2025-06-04 VITALS
RESPIRATION RATE: 16 BRPM | HEART RATE: 79 BPM | TEMPERATURE: 98 F | DIASTOLIC BLOOD PRESSURE: 79 MMHG | SYSTOLIC BLOOD PRESSURE: 135 MMHG

## 2025-06-04 DIAGNOSIS — L97.312 NON-PRESSURE CHRONIC ULCER OF RIGHT ANKLE WITH FAT LAYER EXPOSED (HCC): Primary | ICD-10-CM

## 2025-06-04 DIAGNOSIS — R23.8 SLOUGHING OF WOUND: ICD-10-CM

## 2025-06-04 DIAGNOSIS — I87.331 IDIOPATHIC CHRONIC VENOUS HYPERTENSION OF RIGHT LOWER EXTREMITY WITH ULCER AND INFLAMMATION (HCC): ICD-10-CM

## 2025-06-04 DIAGNOSIS — T14.8XXD DELAYED WOUND HEALING: ICD-10-CM

## 2025-06-04 DIAGNOSIS — M79.89 RIGHT LEG SWELLING: ICD-10-CM

## 2025-06-04 PROCEDURE — 29581 APPL MULTLAYER CMPRN SYS LEG: CPT

## 2025-06-04 NOTE — PROGRESS NOTES
Chief Complaint   Patient presents with    Wound Care     Patients is here for a follow up. Patients stated no issue at this moment         Medications - Current[1]    Allergies[2]       HISTORY:     Past medical, surgical, family and social history updated where appropriate.    PHYSICAL EXAM:   /79   Pulse 79   Temp 98 °F (36.7 °C)   Resp 16        Vital signs reviewed.      Calf     Point of Measurement - Right Calf: 38        Right Calf from:: Heel  Right Calf cm:: 39.5    Ankle     Point of Measurement - Right Ankle: 10           Right Ankle from:: Heel  Right Ankle cm:: 20.8       Wound 05/14/25 #1 Right Medial Ankle Ankle Right;Medial (Active)   Date First Assessed/Time First Assessed: 05/14/25 0907    Wound Number (Wound Clinic Only): #1 Right Medial Ankle  Primary Wound Type: Venous Ulcer  Location: Ankle  Wound Location Orientation: Right;Medial      Assessments 5/14/2025  9:12 AM 6/4/2025  8:09 AM   Wound Image        Drainage Amount Large Moderate   Drainage Description Serosanguineous Serosanguineous   Treatments Compression Compression   Wound Length (cm) 2.9 cm 4.1 cm   Wound Width (cm) 2.5 cm 1.4 cm   Wound Surface Area (cm^2) 5.69 cm^2 4.51 cm^2   Wound Depth (cm) 0.1 cm 0.1 cm   Wound Volume (cm^3) 0.38 cm^3 0.301 cm^3   Wound Healing % -- 21   Margins Well-defined edges Well-defined edges   Non-staged Wound Description Full thickness Full thickness   Katty-wound Assessment Moist;Edema Edema;Blanchable erythema;Dry   Wound Granulation Tissue Red;Pink;Firm Spongy;Pink   Wound Bed Granulation (%) 30 % 20 %   Wound Bed Epithelium (%) 35 % 10 %   Wound Bed Slough (%) 35 % 70 %   Wound Odor None None   Shape Bridged bridged   Tunneling? No No   Undermining? No No   Sinus Tracts? No No       Inactive Orders   Date Order Priority Status Authorizing Provider   05/28/25 0842 Debridement Venous Ulcer Right;Medial Ankle Routine Completed Francesco Heredia MD   05/14/25 0952 Debridement Venous Ulcer  Right;Medial Ankle Routine Completed Francesco Heredia MD       Compression Wrap 05/14/25 Ankle Anterior;Right (Active)   Placement Date: 05/14/25   Location: Ankle  Wound Location Orientation: Anterior;Right      Assessments 5/14/2025 10:18 AM 6/4/2025  8:19 AM   Response to Treatment Well tolerated Well tolerated   Compression Layers Multilayer Multilayer   Compression Product Type Unna Boot Unna Boot;Tubigrip/Spanda   Dressing Applied Yes Yes   Compression Wrap Location Toes to Knee Toes to Knee   Compression Wrap Status Clean;Dry;Intact Clean;Dry;Intact       No associated orders.          ASSESSMENT AND PLAN:        Risks, benefits, and alternatives of current treatment plan discussed in detail.  Questions and concerns addressed. Red flags to RTC or ED reviewed.  Patient (or parent) agrees to plan.      No follow-ups on file.  Weekly Wound Education Note    Teaching Provided To: Patient  Training Topics: Cleasing and general instructions, Compression, Discharge instructions, Disease prevention, Dressing, Venous disease  Training Method: Explain/Verbal  Training Response: Reinforcement needed        Notes: Here for RN visit.    Patient here for nurse visit for wound to right ankle. Arrives with compression wrap intact, tolerating well. Edema measurements to calf are slightly elevated, pt states she is standing a lot due to her work - not elevating as much as she should. Reminded her the importance of elevated to help keep swelling down to aid in wound healing. She states she did receive her 2 layer compression stocking in the mail, will bring to next visit to confirm size. When asked if she has made an appointment at the vein clinic, she states she has not and wants to wait until wound is resolved. Informed patient that she should make an appointment to be assessed and see if there is possibly an intervention to be done. Getting treated for any vein issues can potentially help speed up wound healing as well.  She verbalized understanding and will call to make an appointment at the vein clinic. Pt scheduled to return 6/11/25 for a provider visit.   Continue betamethasone to leg, triad paste to periwound, hydrofera transfer, kerramax, unna boot 30-40mmhg, and spandagrip E from base of toes to above ankle. If the spandagrip seems to be causing pain ok to remove.         Elana TAN RN   6/4/2025  8:23 AM             [1]   Current Outpatient Medications:     mupirocin 2 % External Ointment, Apply 1 Application topically 3 (three) times daily. (Patient not taking: Reported on 5/14/2025), Disp: 15 g, Rfl: 1    mupirocin 2 % External Ointment, Apply to affect lesions tid (Patient not taking: Reported on 5/14/2025), Disp: 30 g, Rfl: 1  [2] No Known Allergies

## 2025-06-11 ENCOUNTER — OFFICE VISIT (OUTPATIENT)
Dept: WOUND CARE | Facility: HOSPITAL | Age: 65
End: 2025-06-11
Attending: INTERNAL MEDICINE
Payer: COMMERCIAL

## 2025-06-11 VITALS
SYSTOLIC BLOOD PRESSURE: 151 MMHG | TEMPERATURE: 98 F | RESPIRATION RATE: 16 BRPM | HEART RATE: 84 BPM | DIASTOLIC BLOOD PRESSURE: 94 MMHG

## 2025-06-11 DIAGNOSIS — I87.303 CHRONIC VENOUS HYPERTENSION INVOLVING BOTH SIDES: ICD-10-CM

## 2025-06-11 DIAGNOSIS — L97.312 NON-PRESSURE CHRONIC ULCER OF RIGHT ANKLE WITH FAT LAYER EXPOSED (HCC): Primary | ICD-10-CM

## 2025-06-11 DIAGNOSIS — M79.89 RIGHT LEG SWELLING: ICD-10-CM

## 2025-06-11 DIAGNOSIS — R23.8 SLOUGHING OF WOUND: ICD-10-CM

## 2025-06-11 DIAGNOSIS — T14.8XXD DELAYED WOUND HEALING: ICD-10-CM

## 2025-06-11 DIAGNOSIS — I87.331 IDIOPATHIC CHRONIC VENOUS HYPERTENSION OF RIGHT LOWER EXTREMITY WITH ULCER AND INFLAMMATION (HCC): ICD-10-CM

## 2025-06-11 PROCEDURE — 97597 DBRDMT OPN WND 1ST 20 CM/<: CPT | Performed by: INTERNAL MEDICINE

## 2025-06-11 NOTE — PROGRESS NOTES
[facility] PROGRESS NOTE  KASSIDY DAVID MD  6/11/2025    Chief Complaint:   Chief Complaint   Patient presents with    Wound Care     Patient is here for a wound care follow up. She denies any current pain to the wound.        HPI:   Subjective   Zaria Monae is a 65 year old female coming in for a follow-up visit.    HPI    Wound improving   Epithelium moving into wound bed.   Tissue quality improving but still lot of slough  Debridement done today  Tolerating wraps ok.   Not compliant with elevating extremities.     Vein clinic consult pending.     Pt very anxious during the appt.   Concern about insurance non-coverage.     Review of Systems  Negative except HPI   Denies chest pain / SOB / palpitations  Denies fever.     Allergies  Allergies[1]    Current Meds:  Current Medications[2]      EXAM:   Objective   Objective    Physical Exam    Vital Signs  Vitals:    06/11/25 0757   BP: (!) 151/94   Pulse: 84   Resp: 16   Temp: 98 °F (36.7 °C)       Wound Assessment  Wound 05/14/25 #1 Right Medial Ankle Ankle Right;Medial (Active)   Date First Assessed/Time First Assessed: 05/14/25 0907    Wound Number (Wound Clinic Only): #1 Right Medial Ankle  Primary Wound Type: Venous Ulcer  Location: Ankle  Wound Location Orientation: Right;Medial      Assessments 5/14/2025  9:12 AM 6/11/2025  7:54 AM   Wound Image         Drainage Amount Large Small   Drainage Description Serosanguineous Serosanguineous   Treatments Compression (unna boot 30-40mmhg) Compression (unna boot 30-40mmhg/ spandagrip E)   Wound Length (cm) 2.9 cm 4.1 cm   Wound Width (cm) 2.5 cm 2.3 cm   Wound Surface Area (cm^2) 5.69 cm^2 7.41 cm^2   Wound Depth (cm) 0.1 cm 0.1 cm   Wound Volume (cm^3) 0.38 cm^3 0.494 cm^3   Wound Healing % -- -30   Margins Well-defined edges Well-defined edges   Non-staged Wound Description Full thickness Full thickness   Katty-wound Assessment Moist;Edema Edema;Blanchable erythema;Moist;Maceration   Wound Granulation Tissue  Red;Pink;Firm Firm;Pink;Red   Wound Bed Granulation (%) 30 % 20 %   Wound Bed Epithelium (%) 35 % 30 %   Wound Bed Slough (%) 35 % 50 %   Wound Odor None None   Shape Bridged --   Tunneling? No No   Undermining? No No   Sinus Tracts? No No       Active Orders   Date Order Priority Status Authorizing Provider   06/11/25 0825 Debridement Venous Ulcer Right;Medial Ankle Routine Active Francesco Heredia MD       Inactive Orders   Date Order Priority Status Authorizing Provider   05/28/25 0842 Debridement Venous Ulcer Right;Medial Ankle Routine Completed Francesco Heredia MD   05/14/25 0952 Debridement Venous Ulcer Right;Medial Ankle Routine Completed Francesco Heredia MD       Compression Wrap 05/14/25 Ankle Anterior;Right (Active)   Placement Date: 05/14/25   Location: Ankle  Wound Location Orientation: Anterior;Right      Assessments 5/14/2025 10:18 AM 6/11/2025  8:55 AM   Response to Treatment Well tolerated Well tolerated   Compression Layers Multilayer Multilayer   Compression Product Type Unna Boot (30-40mmhg) Unna Boot;Tubigrip/Spanda (30-40mmhg)   Dressing Applied Yes Yes   Compression Wrap Location Toes to Knee Toes to Knee   Compression Wrap Status Clean;Dry;Intact Clean;Dry;Intact       No associated orders.          ASSESSMENT AND PLAN:     Assessment     Encounter Diagnosis  1. 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 Idiopathic chronic venous hypertension of right lower extremity with ulcer and inflammation (HCC)    3. Right leg swelling    4. Sloughing of wound    5. Delayed wound healing    6. Chronic venous hypertension involving both sides      PROCEDURES:    Debridement Venous Ulcer Right;Medial Ankle   Wound 05/14/25 #1 Right Medial Ankle Ankle Right;Medial     Performed by: Francesco Heredia MD  Authorized by: Francesco Heredia MD       Consent   Consent obtained? verbal  Consent given by: patient  Risks discussed? procedural risks discussed      Debridement Details  Performed by: physician  Debridement type: conservative sharp  Pain control: lidocaine 4%  Pain control administration type: topical     Pre-debridement measurements  Length (cm): 4.1  Width (cm): 2.3  Depth (cm): 0.1  Surface Area (cm^2): 7.41     Post-debridement measurements  Length (cm): 4.1  Width (cm): 2.3  Depth (cm): 0.1  Percent debrided: 60%  Surface Area (cm^2): 7.41  Area Debrided (cm^2): 4.45  Volume (cm^3): 0.49     Devitalized tissue debrided: biofilm, necrotic debris and slough  Instrument(s) utilized: curette  Comment regarding bleeding: minimal  Hemostasis obtained with: pressure  Procedural pain (0-10): 6  Post-procedural pain: 1   Response to treatment: procedure was tolerated well           PLAN OF CARE:    Serial debridements to hasten healing.   Continue collagen / HF transfer  and compression wraps  Counseled about leg elevation  Avoid prolonged standing please.   Call vein clinic for appt ASAP  Watch out for signs of early infection - counseled.   Plan of care discussed with patient in detail - All questions answered   Return in one week.     TREATMENT NOTE-   Vashe soak prior to dressing application. Betamethasone to leg, triad paste to periwound, raheem, hydrofera transfer, kerramax, and spandagrip E / unna boot 30-40mmhg. She brought in her 2 layer compression stocking - size is correct. Reminded again to make an appointment with vein clinic ASAP - informed her that if there is an procedure that can be done may help her wound improve. She was concerns about getting billed for visits and supplies - highly asdvised multiple times for her call her insurance.     Orders  Orders Placed This Encounter   Procedures    Debridement Venous Ulcer Right;Medial Ankle         Patient Instructions       See me in 1 week  PA for skin subs-  pending  Schedule appt with vein clinic.     Wound Cleaning and Dressings:    Shower with protection.   Dressing changes only in the clinic    DRESSINGS: collagen / HF transfer / kerramax.   Change dressing weekly     Compression Therapy : Yes,  UNNA 30-40 mm hg with add on spandagrip    Compression Therapy Instructions:  1.   Okay to wear overnight      if comfortable.      2.  Avoid prolonged standing in one place.  It is better to have your calf muscles moving       to pump fluid out of the legs.    3.  Elevate leg(s) above the level of the heart when sitting or as much as possible.    4.  Take your diuretics as directed by your provider.  Do not skip doses or change doses      unless instructed to do so by your provider.    5. Do not get leg(s) with compression wrap wet. If wraps are too tight as indicated        By pain, numbness/tingling or discoloration of toes remove wrap completely       and call the   wound center.     Miscellaneous Instructions:  Supplement with a daily multivitamin   Low salt diet  Increase protein intake / consider protein supplements - see below  Elevate extremities at all times when sitting / laying down.    DIETARY MODIFICATIONS TO HELP WITH WOUND HEALING:    Protein: Meats, beans, eggs, milk and yogurt particularly Greek yogurt), tofu, soy nuts, soy protein products    Vitamin C: Citrus fruits and juices, strawberries, tomatoes, tomato juice, peppers, baked potatoes, spinach, broccoli, cauliflower, [place] sprouts, cabbage    Vitamin A: Dark green, leafy vegetables, orange or yellow vegetables, cantaloupe, fortified dairy products, liver, fortified cereals    Zinc: Fortified cereals, red meats, seafood    Consider Izaiah by [employer] (These are essential branch chain amino acids that help with tissue building and wound healing) and take 2 packets/day. you can order online at [employer] or [employer]    ADDITIONAL REMINDERS:    The treatment plan has been discussed at length with you and your provider. Follow all instructions carefully, it is very important. If you do not follow all instructions, you are at  risk of your wound  not healing, infection, possible loss of limb and even end of life.  Please call the clinic during regular business hours ( 7:30 AM - 5:30 PM) if you notice increased bleeding, redness, warmth, pain or pus like drainage or start running a fever greater than 100.3.    For after hour emergencies, please call your primary physician or go to the nearest emergency room.      Patient/Caregiver Education: There are no barriers to learning. Medical education for above diagnosis given.   Answered all questions.    Outcome: Patient verbalizes understanding. Patient is notified to call with any questions, complications, allergies, or worsening or changing symptoms.  Patient is to call with any side effects or complications as a result of the treatments today.      DOCUMENTATION OF TIME SPENT: Code selection for this visit was based on time spent : 35 min on date of service in preparing to see the patient, obtaining and/or reviewing separately obtained history, performing a medically appropriate examination, counseling and educating the patient/family/caregiver, ordering medications or testing, referring and communicating with other healthcare providers, documenting clinical information in the E HR, independently interpreting results and communicating results to the patient/family/caregiver and care coordination with the patient's other providers.    Followup: Return in about 1 week (around 6/18/2025) for Wound followup.      Note to Patient:  The 21st Century Cures Act makes medical notes like these available to patients in the interest of transparency. However, be advised this is a medical document and is intended as vilh-ku-qitc communication; it is written in medical language and may appear blunt, direct, or contain abbreviations or verbiage that are unfamiliar. Medical documents are intended to carry relevant information, facts as evident, and the clinical opinion of the practitioner.    Also, please note that this report  has been produced using speech recognition software and may contain errors related to that system including, but not limited to, errors in grammar, punctuation, and spelling, as well as words and phrases that possibly may have been recognized inappropriately.  If there are any questions or concerns, contact the dictating provider for clarification.      Francesco Waldrop MD  6/11/2025                          [1] No Known Allergies  [2]   Current Outpatient Medications   Medication Sig Dispense Refill    mupirocin 2 % External Ointment Apply 1 Application topically 3 (three) times daily. (Patient not taking: Reported on 5/14/2025) 15 g 1    mupirocin 2 % External Ointment Apply to affect lesions tid (Patient not taking: Reported on 5/14/2025) 30 g 1

## 2025-06-11 NOTE — PROGRESS NOTES
Weekly Wound Education Note    Teaching Provided To: Patient  Training Topics: Cleasing and general instructions, Compression, Discharge instructions, Dressing, Edema control  Training Method: Explain/Verbal  Training Response: Patient responds and understands, Reinforcement needed        Notes: Stable. Vashe soak prior to dressing application. Betamethasone to leg, triad paste to periwound, raheem, hydrofera transfer, kerramax, and spandagrip E / unna boot 30-40mmhg. She brought in her 2 layer compression stocking - size is correct. Reminded again to make an appointment with vein clinic ASAP - informed her that if there is an procedure that can be done may help her wound improve. She was concerns about getting billed for visits and supplies - highly asdvised multiple times for her call her insurance.

## 2025-06-11 NOTE — PATIENT INSTRUCTIONS
See me in 1 week  PA for skin subs-  pending  Schedule appt with vein clinic.     Wound Cleaning and Dressings:    Shower with protection.   Dressing changes only in the clinic   DRESSINGS: collagen / HF transfer / kerramax.   Change dressing weekly     Compression Therapy : Yes,  UNNA 30-40 mm hg with add on spandagrip    Compression Therapy Instructions:  1.   Okay to wear overnight      if comfortable.      2.  Avoid prolonged standing in one place.  It is better to have your calf muscles moving       to pump fluid out of the legs.    3.  Elevate leg(s) above the level of the heart when sitting or as much as possible.    4.  Take your diuretics as directed by your provider.  Do not skip doses or change doses      unless instructed to do so by your provider.    5. Do not get leg(s) with compression wrap wet. If wraps are too tight as indicated        By pain, numbness/tingling or discoloration of toes remove wrap completely       and call the   wound center.     Miscellaneous Instructions:  Supplement with a daily multivitamin   Low salt diet  Increase protein intake / consider protein supplements - see below  Elevate extremities at all times when sitting / laying down.    DIETARY MODIFICATIONS TO HELP WITH WOUND HEALING:    Protein: Meats, beans, eggs, milk and yogurt particularly Greek yogurt), tofu, soy nuts, soy protein products    Vitamin C: Citrus fruits and juices, strawberries, tomatoes, tomato juice, peppers, baked potatoes, spinach, broccoli, cauliflower, [place] sprouts, cabbage    Vitamin A: Dark green, leafy vegetables, orange or yellow vegetables, cantaloupe, fortified dairy products, liver, fortified cereals    Zinc: Fortified cereals, red meats, seafood    Consider Izaiah by [employer] (These are essential branch chain amino acids that help with tissue building and wound healing) and take 2 packets/day. you can order online at [employer] or [employer]    ADDITIONAL REMINDERS:    The treatment plan has  been discussed at length with you and your provider. Follow all instructions carefully, it is very important. If you do not follow all instructions, you are at  risk of your wound not healing, infection, possible loss of limb and even end of life.  Please call the clinic during regular business hours ( 7:30 AM - 5:30 PM) if you notice increased bleeding, redness, warmth, pain or pus like drainage or start running a fever greater than 100.3.    For after hour emergencies, please call your primary physician or go to the nearest emergency room.

## 2025-06-11 NOTE — PROGRESS NOTES
Patient ID: Zaria Monae is a 65 year old female.    Debridement Venous Ulcer Right;Medial Ankle   Wound 05/14/25 #1 Right Medial Ankle Ankle Right;Medial    Performed by: Francesco Heredia MD  Authorized by: Francesco Heredia MD      Consent   Consent obtained? verbal  Consent given by: patient  Risks discussed? procedural risks discussed    Debridement Details  Performed by: physician  Debridement type: conservative sharp  Pain control: lidocaine 4%  Pain control administration type: topical    Pre-debridement measurements  Length (cm): 4.1  Width (cm): 2.3  Depth (cm): 0.1  Surface Area (cm^2): 7.41    Post-debridement measurements  Length (cm): 4.1  Width (cm): 2.3  Depth (cm): 0.1  Percent debrided: 60%  Surface Area (cm^2): 7.41  Area Debrided (cm^2): 4.45  Volume (cm^3): 0.49    Devitalized tissue debrided: biofilm, necrotic debris and slough  Instrument(s) utilized: curette  Comment regarding bleeding: minimal  Hemostasis obtained with: pressure  Procedural pain (0-10): 6  Post-procedural pain: 1   Response to treatment: procedure was tolerated well

## 2025-06-17 NOTE — PROGRESS NOTES
CHIEF COMPLAINT:     Chief Complaint   Patient presents with    Wound Care     Patient arrives for a wound care follow up appointment. Patient reports no concerns for the provider at this time. Patient is a little concerned because of how hot it will be with Spandagrip over the wrap. Patient reports no pain. Wrap stayed up well.        HPI:   Information obtained from Patient and chart  4-7-2020 (Per marvin espitia/marybel):Patient is 60 year old woman with known varicose veins with venous ulcer of right lower ext for more than 8 weeks. 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 Patient stated she has compression stocking 20 mmHg, unable to wear them due to wound/pain in the past two months. Patient has had seen a vascular surgeon years ago, PCP placed a referral for re-evaluation. Patient was prescribed mupirocin 2 % External Ointment by PCP.  Patient was referred to wound care clinic by her PCP Magdalena SKINNER.    5-14-25 INITIAL (per SP):  65-year-old  female here for evaluation and management of open wound on the right ankle.  She has had wounds in this area for several years on and off.  The current wound opened up few months ago and has she has been going through treatment through her PCP and has not been healing and hence was referred to us for further management.  Currently using mupirocin ointment.  The recent some periwound redness which seems more like reactionary erythema-there is no malodor/purulent drainage/fever.  She does have stigmata of chronic venous hypertension including dilated varicose veins on bilateral lower extremities, spider veins, loss of hair, and skin discoloration.    6-18-25 (per sj):  Patient switching providers due to insurance issues.  Prior to 5-14 patient was seeing marvin espitia with [facility].  She has been referred to vein clinic by dr. Waldrop. She did not make an appointment yet as she was  concerned about insurance coverage. I will give her [facility] vascular number and asked her to call and get an appointment with dr. Najjar or Steph, she also has 30-40mmhg stockings for post healing that were ordered. Epifix is not covered.  Patient states that prior to the ulceration occurring she may have been getting lax with wearing stockings during the day. She was wearing 10-20mmhg. Today there is not any s/s of  infection. We discussed addressing the \"internal\" vein issues as well as supporting with \"external\" healing and compression. Patient will bring her stockings next week and we will come up with treatment plan that she can managed independently with her stockings. Patient is able to ambulate without difficulty.    MEDICATIONS:   Medications - Current[1]  ALLERGIES:   Allergies[2]   REVIEW OF SYSTEMS:   This information was obtained from the patient/family and chart.    See HPI for pertinent positives, otherwise 10 pt ROS negative.    HISTORY:   Past Medical History[3]  Past Surgical History[4]   Short Social Hx on File[5]  PHYSICAL EXAM:     Vitals:    06/18/25 0700 06/18/25 0830   BP: (!) 146/93 140/72   Pulse: 77 71   Resp: 15    Temp: 97.3 °F (36.3 °C)       Estimated body mass index is 30.34 kg/m² as calculated from the following:    Height as of 5/14/25: 66\".    Weight as of 5/14/25: 188 lb (85.3 kg).   No results found for: \"PGLU\"    Vital signs reviewed.Appears stated age, well groomed.    Constitutional:  Bp initially elevated, patient is anxious of changing providers again, recheck improved. Pulse Regular and wnl for patient. Respirations easy and unlabored. Temperature wnl. Elevated bmi. Appearance neat and clean. Appears in no acute distress. Well nourished and well developed.    Lower extremity:  dp/pt palpable left with strong pulsatile signals at the dp/pt/distal hallux. Left lower extremity + for varicosities, corona phlebectica, edema is stable. Capillary refill < 3 seconds. Digits are warm.  toenails are wnl for color, thickness and hygeine. Skin hydration wnl. no hairgrowth on leg.    Musculoskeletal:  Gait and station stable   Integumentary:  refer to wound characteristics and images   Psychiatric:  Judgment and insight intact. Alert and oriented times 3. No evidence of depression, anxiety, or agitation. Calm, cooperative, and communicative. Appropriate interactions and affect.  EDEMA:   Calf     Point of Measurement - Right Calf: 38        Right Calf from:: Heel  Right Calf cm:: 37  Ankle     Point of Measurement - Right Ankle: 10           Right Ankle from:: Heel  Right Ankle cm:: 20     DIAGNOSTICS:     No results found for: \"BUN\", \"CREATSERUM\", \"GFRCKDEPI\", \"ALB\", \"TP\", \"PREALB\", \"A1C\"    WOUND ASSESSMENT:     Wound 05/14/25 #1 Right Medial Ankle Ankle Right;Medial (Active)   Date First Assessed/Time First Assessed: 05/14/25 0907    Wound Number (Wound Clinic Only): #1 Right Medial Ankle  Primary Wound Type: Venous Ulcer  Location: Ankle  Wound Location Orientation: Right;Medial      Assessments 5/14/2025  9:12 AM 6/18/2025  8:06 AM   Wound Image        Drainage Amount Large Moderate   Drainage Description Serosanguineous Serosanguineous   Treatments Compression (unna boot 30-40mmhg) --   Wound Length (cm) 2.9 cm 4.1 cm   Wound Width (cm) 2.5 cm 2 cm   Wound Surface Area (cm^2) 5.69 cm^2 6.44 cm^2   Wound Depth (cm) 0.1 cm 0.1 cm   Wound Volume (cm^3) 0.38 cm^3 0.429 cm^3   Wound Healing % -- -13   Margins Well-defined edges Well-defined edges   Non-staged Wound Description Full thickness Full thickness   Katty-wound Assessment Moist;Edema Edema;Hemosiderin staining   Wound Granulation Tissue Red;Pink;Firm Pink;Spongy;Red   Wound Bed Granulation (%) 30 % 30 %   Wound Bed Epithelium (%) 35 % 50 %   Wound Bed Slough (%) 35 % 20 %   Wound Odor None None   Shape Bridged bridged   Tunneling? No No   Undermining? No No   Sinus Tracts? No No       Inactive Orders   Date Order Priority Status Authorizing  Provider   06/11/25 0825 Debridement Venous Ulcer Right;Medial Ankle Routine Completed Francesco Heredia MD   05/28/25 0842 Debridement Venous Ulcer Right;Medial Ankle Routine Completed Francesco Heredia MD   05/14/25 0952 Debridement Venous Ulcer Right;Medial Ankle Routine Completed Francesco Heredia MD       Compression Wrap 05/14/25 Ankle Anterior;Right (Active)   Placement Date: 05/14/25   Location: Ankle  Wound Location Orientation: Anterior;Right      Assessments 5/14/2025 10:18 AM 6/11/2025  8:55 AM   Response to Treatment Well tolerated Well tolerated   Compression Layers Multilayer Multilayer   Compression Product Type Unna Boot (30-40mmhg) Unna Boot;Tubigrip/Spanda (30-40mmhg)   Dressing Applied Yes Yes   Compression Wrap Location Toes to Knee Toes to Knee   Compression Wrap Status Clean;Dry;Intact Clean;Dry;Intact       No associated orders.              ASSESSMENT AND PLAN:      1. Non-pressure chronic ulcer of right ankle with fat layer exposed (HCC)  - Vascular Surgery - In Network    2. Idiopathic chronic venous hypertension of right lower extremity with ulcer and inflammation (HCC)  - Vascular Surgery - In Network        Discussed with patient the aspects of wound healing including:  blood flow in/out (arterial vs venous) and managing edema with appropriate compression, wound bed optimization including moist wound healing, removal of necrosis, bioburden control, monitoring for infection, and finally the patient as a whole including nutrition and increased protein intake.      Risks, benefits, and alternatives of current treatment plan discussed in detail.  Questions and concerns addressed. Red flags to RTC or ED reviewed.  Patient (or parent) agrees to plan.      NOTE TO PATIENT: The 21st Century Cures Act makes clinical notes like these available to patients in the interest of transparency. Clinical notes are medical documents used by physicians and care providers to communicate with each  other. These documents include medical language and terminology, abbreviations, and treatment information that may sound technical and at times possibly unfamiliar. In addition, at times, the verbiage may appear blunt or direct. These documents are one tool providers use to communicate relevant information and clinical opinions of the care providers in a way that allows common understanding of the clinical context.   Patient is new to me, has see [facility] providers previously.  I spent   40   minutes with the patient. This time included:    preparing to see the patient (eg, review notes and recent diagnostics),  seeing the patient, obtaining and/or reviewing separately obtained history, performing a medically appropriate examination and/or evaluation, counseling and educating the patient, documenting in the record, update to vascular  DISCHARGE:      Patient Instructions   Please return: 1 week>bring your compression stockings    Things to do  Imaging & Consults:  VASCULAR SURGERY - INTERNAL  Dr. Samer Najjar or Dr. Selena Choi  [phone]       Patient discharge and wound care instructions  Zaria Letha  6/18/2025       Wound Cleaning and Dressings:    Shower with protection.   Dressing changes only in the clinic   DRESSINGS: betamethasone/triad around the wound edges  Small amount of honey>honey gauze>foam   Change dressing weekly     Compression Therapy :UNNA 30-40 mm hg    Compression Therapy Instructions:  1.   Okay to wear overnight    2.  Avoid prolonged standing in one place.  It is better to have your calf muscles moving       to pump fluid out of the legs.  3.  Elevate leg(s) above the level of the heart when sitting or as much as possible.  4.  Take your diuretics as directed by your provider.  Do not skip doses or change doses      unless instructed to do so by your provider.  5. Do not get leg(s) with compression wrap wet. If wraps are too tight as indicated        By pain, numbness/tingling or  discoloration of toes remove wrap completely       and call the   wound center.     Miscellaneous Instructions:  Supplement with a daily multivitamin   Low salt diet  Increase protein intake / consider protein supplements - see below  Elevate extremities at all times when sitting / laying down.    DIETARY MODIFICATIONS TO HELP WITH WOUND HEALING:    Protein: Meats, beans, eggs, milk and yogurt particularly Greek yogurt), tofu, soy nuts, soy protein products  Vitamin C: Citrus fruits and juices, strawberries, tomatoes, tomato juice, peppers, baked potatoes, spinach, broccoli, cauliflower, [place] sprouts, cabbage  Vitamin A: Dark green, leafy vegetables, orange or yellow vegetables, cantaloupe, fortified dairy products, liver, fortified cereals  Zinc: Fortified cereals, red meats, seafood  Consider Izaiah [employer] (These are essential branch chain amino acids that help with tissue building and wound healing) and take 2 packets/day. you can order online at [employer] or [employer]    ADDITIONAL REMINDERS:  The treatment plan has been discussed at length with you and your provider. Follow all instructions carefully, it is very important. If you do not follow all instructions, you are at  risk of your wound not healing, infection, possible loss of limb and even end of life.  Please call the clinic during regular business hours ( 7:30 AM - 5:30 PM) if you notice increased bleeding, redness, warmth, pain or pus like drainage or start running a fever greater than 100.3.    For after hour emergencies, please call your primary physician or go to the nearest emergency room.   Yancy Bhatti FNP-C, CWCN-AP, CFCN, CSWS, WCC, DWC  6/18/2025            [1]   Current Outpatient Medications:     mupirocin 2 % External Ointment, Apply 1 Application topically 3 (three) times daily. (Patient not taking: Reported on 5/14/2025), Disp: 15 g, Rfl: 1    mupirocin 2 % External Ointment, Apply to affect lesions tid (Patient not taking:  Reported on 5/14/2025), Disp: 30 g, Rfl: 1  [2] No Known Allergies  [3] No past medical history on file.  [4] No past surgical history on file.  [5]   Social History  Socioeconomic History    Marital status:    Tobacco Use    Smoking status: Never     Passive exposure: Never    Smokeless tobacco: Never   Vaping Use    Vaping status: Never Used   Substance and Sexual Activity    Alcohol use: Yes     Comment: wine once in awhile    Drug use: No     Social Drivers of Health     Food Insecurity: No Food Insecurity (3/25/2025)    NCSS - Food Insecurity     Worried About Running Out of Food in the Last Year: No     Ran Out of Food in the Last Year: No   Transportation Needs: No Transportation Needs (3/25/2025)    NCSS - Transportation     Lack of Transportation: No   Housing Stability: Not At Risk (3/25/2025)    NCSS - Housing/Utilities     Has Housing: Yes     Worried About Losing Housing: No     Unable to Get Utilities: No

## 2025-06-18 ENCOUNTER — OFFICE VISIT (OUTPATIENT)
Dept: WOUND CARE | Facility: HOSPITAL | Age: 65
End: 2025-06-18
Attending: INTERNAL MEDICINE
Payer: COMMERCIAL

## 2025-06-18 VITALS
RESPIRATION RATE: 15 BRPM | HEART RATE: 71 BPM | SYSTOLIC BLOOD PRESSURE: 140 MMHG | DIASTOLIC BLOOD PRESSURE: 72 MMHG | TEMPERATURE: 97 F

## 2025-06-18 DIAGNOSIS — I87.331 IDIOPATHIC CHRONIC VENOUS HYPERTENSION OF RIGHT LOWER EXTREMITY WITH ULCER AND INFLAMMATION (HCC): ICD-10-CM

## 2025-06-18 DIAGNOSIS — L97.312 NON-PRESSURE CHRONIC ULCER OF RIGHT ANKLE WITH FAT LAYER EXPOSED (HCC): Primary | ICD-10-CM

## 2025-06-18 PROCEDURE — 29581 APPL MULTLAYER CMPRN SYS LEG: CPT

## 2025-06-18 NOTE — PROGRESS NOTES
.Weekly Wound Education Note    Teaching Provided To: Patient  Training Topics: Discharge instructions, Cleasing and general instructions, Compression, Dressing, Edema control  Training Method: Explain/Verbal, Written  Training Response: Patient responds and understands            Betamethasone, Coloplast TRIAD paste to bernardo area.  Honey gel, honey gauze, kerramax to wound.  Calamine unna boot 30-40mmHg.  Patient instructed to bring her compression sock to next visit.

## 2025-06-20 ENCOUNTER — TELEPHONE (OUTPATIENT)
Facility: CLINIC | Age: 65
End: 2025-06-20

## 2025-06-20 NOTE — TELEPHONE ENCOUNTER
Spoke with patient about making an appointment to be seen in Vascular Surgery by either Dr. Najjar or Dr. Arias. Patient understood but would like to wait till wound is healed then she will make appointment to see our office.

## 2025-06-24 NOTE — PROGRESS NOTES
CHIEF COMPLAINT:     Chief Complaint   Patient presents with    Wound Care     Patient arrives for a wound care follow up appointment. Patient reports no pain. Wrap stayed up well. Patient arrives with an unna wrap on. Honey gel. Honey gauze, and kerramax to the wound.        HPI:   Information obtained from Patient and chart  4-7-2020 (Per marvin espitia/marybel):Patient is 60 year old woman with known varicose veins with venous ulcer of right lower ext for more than 8 weeks. 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 Patient stated she has compression stocking 20 mmHg, unable to wear them due to wound/pain in the past two months. Patient has had seen a vascular surgeon years ago, PCP placed a referral for re-evaluation. Patient was prescribed mupirocin 2 % External Ointment by PCP.  Patient was referred to wound care clinic by her PCP Magdalena SKINNER.    5-14-25 INITIAL (per SP):  65-year-old  female here for evaluation and management of open wound on the right ankle.  She has had wounds in this area for several years on and off.  The current wound opened up few months ago and has she has been going through treatment through her PCP and has not been healing and hence was referred to us for further management.  Currently using mupirocin ointment.  The recent some periwound redness which seems more like reactionary erythema-there is no malodor/purulent drainage/fever.  She does have stigmata of chronic venous hypertension including dilated varicose veins on bilateral lower extremities, spider veins, loss of hair, and skin discoloration.    6-18-25 (per sj):  Patient switching providers due to insurance issues.  Prior to 5-14 patient was seeing marvin espitia with [facility].  She has been referred to vein clinic by dr. Waldrop. She did not make an appointment yet as she was concerned about insurance coverage. I will give her [facility] vascular  number and asked her to call and get an appointment with dr. Najjar or Steph, she also has 30-40mmhg stockings for post healing that were ordered. Epifix is not covered.  Patient states that prior to the ulceration occurring she may have been getting lax with wearing stockings during the day. She was wearing 10-20mmhg. Today there is not any s/s of  infection. We discussed addressing the \"internal\" vein issues as well as supporting with \"external\" healing and compression. Patient will bring her stockings next week and we will come up with treatment plan that she can managed independently with her stockings. Patient is able to ambulate without difficulty.    6-25-25 Patient returns. She states vascular called her but she has not made an appointment yet. Patient encouraged to do so. She did bring her 2 layer stocking and it is on the large side of the sizing, but is ok/adequate.  Her wound edges are macerated, but there is definite bridges of epi dividing the wound in parts.  There is also hypergran which was tx with agno3, topical steroid and hydrofera transfer as the dressing.  No c/o pain or s/s of infection.    MEDICATIONS:   Medications - Current[1]  ALLERGIES:   Allergies[2]   REVIEW OF SYSTEMS:   This information was obtained from the patient/family and chart.    See HPI for pertinent positives, otherwise 10 pt ROS negative.    HISTORY:   Past Medical History[3]  Past Surgical History[4]   Short Social Hx on File[5]  PHYSICAL EXAM:     Vitals:    06/25/25 0755   BP: 145/85   Pulse: 65   Resp: 15   Temp: 98 °F (36.7 °C)        Estimated body mass index is 30.34 kg/m² as calculated from the following:    Height as of 5/14/25: 66\".    Weight as of 5/14/25: 188 lb (85.3 kg).   No results found for: \"PGLU\"    Vital signs reviewed.Appears stated age, well groomed.    Constitutional:  Bp wnl for patient. Pulse Regular and wnl for patient. Respirations easy and unlabored. Temperature wnl. Elevated bmi. Appearance neat  and clean. Appears in no acute distress. Well nourished and well developed.    Lower extremity:  dp/pt palpable left. Left lower extremity + for varicosities, corona phlebectica, edema is stable. Capillary refill < 3 seconds. Digits are warm. toenails are wnl for color, thickness and hygeine. Skin hydration wnl. no hairgrowth on leg.    Musculoskeletal:  Gait and station stable   Integumentary:  refer to wound characteristics and images   Psychiatric:  Judgment and insight intact. Alert and oriented times 3. No evidence of depression, anxiety, or agitation. Calm, cooperative, and communicative. Appropriate interactions and affect.  EDEMA:   Calf     Point of Measurement - Right Calf: 38        Right Calf from:: Heel  Right Calf cm:: 37  Ankle     Point of Measurement - Right Ankle: 10           Right Ankle from:: Heel  Right Ankle cm:: 20     DIAGNOSTICS:     No results found for: \"BUN\", \"CREATSERUM\", \"GFRCKDEPI\", \"ALB\", \"TP\", \"PREALB\", \"A1C\"    WOUND ASSESSMENT:     Wound 05/14/25 #1 Right Medial Ankle Ankle Right;Medial (Active)   Date First Assessed/Time First Assessed: 05/14/25 0907    Wound Number (Wound Clinic Only): #1 Right Medial Ankle  Primary Wound Type: Venous Ulcer  Location: Ankle  Wound Location Orientation: Right;Medial      Assessments 5/14/2025  9:12 AM 6/25/2025  7:58 AM   Wound Image        Drainage Amount Large Moderate   Drainage Description Serosanguineous Serosanguineous   Treatments Compression (unna boot 30-40mmhg) --   Wound Length (cm) 2.9 cm 4.3 cm   Wound Width (cm) 2.5 cm 2 cm   Wound Surface Area (cm^2) 5.69 cm^2 6.75 cm^2   Wound Depth (cm) 0.1 cm 0.1 cm   Wound Volume (cm^3) 0.38 cm^3 0.45 cm^3   Wound Healing % -- -18   Margins Well-defined edges Well-defined edges   Non-staged Wound Description Full thickness Full thickness   Katty-wound Assessment Moist;Edema Maceration;Moist;Edema   Wound Granulation Tissue Red;Pink;Firm Red;Spongy   Wound Bed Granulation (%) 30 % 50 %   Wound  Bed Epithelium (%) 35 % 30 %   Wound Bed Slough (%) 35 % 20 %   Wound Odor None None   Shape Bridged --   Tunneling? No No   Undermining? No No   Sinus Tracts? No No       Inactive Orders   Date Order Priority Status Authorizing Provider   06/11/25 0825 Debridement Venous Ulcer Right;Medial Ankle Routine Completed Francesco Heredia MD   05/28/25 0842 Debridement Venous Ulcer Right;Medial Ankle Routine Completed Francesco Heredia MD   05/14/25 0952 Debridement Venous Ulcer Right;Medial Ankle Routine Completed Francesco Heredia MD       Compression Wrap 05/14/25 Ankle Anterior;Right (Active)   Placement Date: 05/14/25   Location: Ankle  Wound Location Orientation: Anterior;Right      Assessments 5/14/2025 10:18 AM 6/18/2025 11:07 AM   Response to Treatment Well tolerated Well tolerated   Compression Layers Multilayer Multilayer   Compression Product Type Unna Boot (30-40mmhg) Unna Boot (calamine unna boot 30-40mmHg)   Dressing Applied Yes Yes (Betamethasone, honey gel, honey gauze, kerramax)   Compression Wrap Location Toes to Knee Toes to Knee   Compression Wrap Status Clean;Dry;Intact Dry;Clean       No associated orders.              ASSESSMENT AND PLAN:      1. Excessive granulation tissue    2. Non-pressure chronic ulcer of right ankle with fat layer exposed (HCC)    3. Idiopathic chronic venous hypertension of right lower extremity with ulcer and inflammation (HCC)    4. Right leg swelling        Risks, benefits, and alternatives of current treatment plan discussed in detail.  Questions and concerns addressed. Red flags to RTC or ED reviewed.  Patient (or parent) agrees to plan.      NOTE TO PATIENT: The 21st Century Cures Act makes clinical notes like these available to patients in the interest of transparency. Clinical notes are medical documents used by physicians and care providers to communicate with each other. These documents include medical language and terminology, abbreviations, and treatment  information that may sound technical and at times possibly unfamiliar. In addition, at times, the verbiage may appear blunt or direct. These documents are one tool providers use to communicate relevant information and clinical opinions of the care providers in a way that allows common understanding of the clinical context.   I spent  30minutes with the patient. This time included:    preparing to see the patient (eg, review notes and recent diagnostics),  seeing the patient, obtaining and/or reviewing separately obtained history, performing a medically appropriate examination and/or evaluation, counseling and educating the patient, documenting in the record,   DISCHARGE:      Patient Instructions   Please return: 1 week>bring your compression stockings    Things to do  Imaging & Consults:  None  Dr. Samer Najjar or Dr. Selena Choi  [phone]       Patient discharge and wound care instructions  Zaria Monae  6/25/2025         Wound Cleaning and Dressings:    Shower with protection.   Dressing changes only in the clinic   DRESSINGS: betamethasone on the wound  Transfer>abd pad  Change dressing weekly     Compression Therapy :UNNA 30-40 mm hg    Compression Therapy Instructions:  1.   Okay to wear overnight    2.  Avoid prolonged standing in one place.  It is better to have your calf muscles moving       to pump fluid out of the legs.  3.  Elevate leg(s) above the level of the heart when sitting or as much as possible.  4.  Take your diuretics as directed by your provider.  Do not skip doses or change doses      unless instructed to do so by your provider.  5. Do not get leg(s) with compression wrap wet. If wraps are too tight as indicated        By pain, numbness/tingling or discoloration of toes remove wrap completely       and call the   wound center.     Miscellaneous Instructions:  Supplement with a daily multivitamin   Low salt diet  Increase protein intake / consider protein supplements - see below  Elevate  extremities at all times when sitting / laying down.    DIETARY MODIFICATIONS TO HELP WITH WOUND HEALING:    Protein: Meats, beans, eggs, milk and yogurt particularly Greek yogurt), tofu, soy nuts, soy protein products  Vitamin C: Citrus fruits and juices, strawberries, tomatoes, tomato juice, peppers, baked potatoes, spinach, broccoli, cauliflower, [place] sprouts, cabbage  Vitamin A: Dark green, leafy vegetables, orange or yellow vegetables, cantaloupe, fortified dairy products, liver, fortified cereals  Zinc: Fortified cereals, red meats, seafood  Consider Izaiah by [employer] (These are essential branch chain amino acids that help with tissue building and wound healing) and take 2 packets/day. you can order online at [employer] or [employer]    ADDITIONAL REMINDERS:  The treatment plan has been discussed at length with you and your provider. Follow all instructions carefully, it is very important. If you do not follow all instructions, you are at  risk of your wound not healing, infection, possible loss of limb and even end of life.  Please call the clinic during regular business hours ( 7:30 AM - 5:30 PM) if you notice increased bleeding, redness, warmth, pain or pus like drainage or start running a fever greater than 100.3.    For after hour emergencies, please call your primary physician or go to the nearest emergency room.   Yancy Bhatti FNP-C, CWCN-AP, CFCN, CSWS, WCC, DWC  6/25/2025            [1]   Current Outpatient Medications:     mupirocin 2 % External Ointment, Apply 1 Application topically 3 (three) times daily. (Patient not taking: Reported on 5/14/2025), Disp: 15 g, Rfl: 1    mupirocin 2 % External Ointment, Apply to affect lesions tid (Patient not taking: Reported on 5/14/2025), Disp: 30 g, Rfl: 1  [2] No Known Allergies  [3] No past medical history on file.  [4] No past surgical history on file.  [5]   Social History  Socioeconomic History    Marital status:    Tobacco Use    Smoking status:  Never     Passive exposure: Never    Smokeless tobacco: Never   Vaping Use    Vaping status: Never Used   Substance and Sexual Activity    Alcohol use: Yes     Comment: wine once in awhile    Drug use: No     Social Drivers of Health     Food Insecurity: No Food Insecurity (3/25/2025)    NCSS - Food Insecurity     Worried About Running Out of Food in the Last Year: No     Ran Out of Food in the Last Year: No   Transportation Needs: No Transportation Needs (3/25/2025)    NCSS - Transportation     Lack of Transportation: No   Housing Stability: Not At Risk (3/25/2025)    NCSS - Housing/Utilities     Has Housing: Yes     Worried About Losing Housing: No     Unable to Get Utilities: No

## 2025-06-25 ENCOUNTER — OFFICE VISIT (OUTPATIENT)
Dept: WOUND CARE | Facility: HOSPITAL | Age: 65
End: 2025-06-25
Attending: INTERNAL MEDICINE
Payer: COMMERCIAL

## 2025-06-25 VITALS
SYSTOLIC BLOOD PRESSURE: 145 MMHG | RESPIRATION RATE: 15 BRPM | DIASTOLIC BLOOD PRESSURE: 85 MMHG | TEMPERATURE: 98 F | HEART RATE: 65 BPM

## 2025-06-25 DIAGNOSIS — M79.89 RIGHT LEG SWELLING: ICD-10-CM

## 2025-06-25 DIAGNOSIS — I87.331 IDIOPATHIC CHRONIC VENOUS HYPERTENSION OF RIGHT LOWER EXTREMITY WITH ULCER AND INFLAMMATION (HCC): ICD-10-CM

## 2025-06-25 DIAGNOSIS — L92.9 EXCESSIVE GRANULATION TISSUE: ICD-10-CM

## 2025-06-25 DIAGNOSIS — L97.312 NON-PRESSURE CHRONIC ULCER OF RIGHT ANKLE WITH FAT LAYER EXPOSED (HCC): Primary | ICD-10-CM

## 2025-06-25 PROCEDURE — 99214 OFFICE O/P EST MOD 30 MIN: CPT | Performed by: NURSE PRACTITIONER

## 2025-06-25 NOTE — PROGRESS NOTES
Weekly Wound Education Note    Teaching Provided To: Patient  Training Topics: Cleasing and general instructions, Compression, Dressing, Edema control, Skin care, Signs and symptoms of infection, Safe and effective use of medical equipement and /or supplies  Training Method: Explain/Verbal, Written  Training Response: Patient responds and understands        Notes: Right med ankle wound- silver nitrate applied to hypergranular tissue. Betametasone to wound bed, hydrofera transfer, ABD, and calamine wrap 30-40. Patient brought in her compression stocking that Yancy confirmed okay to use once out of the wrap.

## 2025-06-25 NOTE — PATIENT INSTRUCTIONS
Please return: 1 week>bring your compression stockings    Things to do  Imaging & Consults:  None  Dr. Samer Najjar or Dr. Selena Choi  [phone]       Patient discharge and wound care instructions  Zaria Monae  6/25/2025         Wound Cleaning and Dressings:    Shower with protection.   Dressing changes only in the clinic   DRESSINGS: betamethasone on the wound  Transfer>abd pad  Change dressing weekly     Compression Therapy :UNNA 30-40 mm hg    Compression Therapy Instructions:  1.   Okay to wear overnight    2.  Avoid prolonged standing in one place.  It is better to have your calf muscles moving       to pump fluid out of the legs.  3.  Elevate leg(s) above the level of the heart when sitting or as much as possible.  4.  Take your diuretics as directed by your provider.  Do not skip doses or change doses      unless instructed to do so by your provider.  5. Do not get leg(s) with compression wrap wet. If wraps are too tight as indicated        By pain, numbness/tingling or discoloration of toes remove wrap completely       and call the   wound center.     Miscellaneous Instructions:  Supplement with a daily multivitamin   Low salt diet  Increase protein intake / consider protein supplements - see below  Elevate extremities at all times when sitting / laying down.    DIETARY MODIFICATIONS TO HELP WITH WOUND HEALING:    Protein: Meats, beans, eggs, milk and yogurt particularly Greek yogurt), tofu, soy nuts, soy protein products  Vitamin C: Citrus fruits and juices, strawberries, tomatoes, tomato juice, peppers, baked potatoes, spinach, broccoli, cauliflower, [place] sprouts, cabbage  Vitamin A: Dark green, leafy vegetables, orange or yellow vegetables, cantaloupe, fortified dairy products, liver, fortified cereals  Zinc: Fortified cereals, red meats, seafood  Consider Izaiah by [employer] (These are essential branch chain amino acids that help with tissue building and wound healing) and take 2 packets/day.  you can order online at [employer] or [employer]    ADDITIONAL REMINDERS:  The treatment plan has been discussed at length with you and your provider. Follow all instructions carefully, it is very important. If you do not follow all instructions, you are at  risk of your wound not healing, infection, possible loss of limb and even end of life.  Please call the clinic during regular business hours ( 7:30 AM - 5:30 PM) if you notice increased bleeding, redness, warmth, pain or pus like drainage or start running a fever greater than 100.3.    For after hour emergencies, please call your primary physician or go to the nearest emergency room.

## 2025-07-01 NOTE — PROGRESS NOTES
CHIEF COMPLAINT:     Chief Complaint   Patient presents with    Wound Care     Follow up for right ankle wound. Pt stated that she noticed drainage shadowing through the compression wrap.        HPI:   Information obtained from Patient and chart  4-7-2020 (Per marvin espitia/[facility]):Patient is 60 year old woman with known varicose veins with venous ulcer of right lower ext for more than 8 weeks. 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 Patient stated she has compression stocking 20 mmHg, unable to wear them due to wound/pain in the past two months. Patient has had seen a vascular surgeon years ago, PCP placed a referral for re-evaluation. Patient was prescribed mupirocin 2 % External Ointment by PCP.  Patient was referred to wound care clinic by her PCP Magdalena SKINNER.    5-14-25 INITIAL (per SP):  65-year-old  female here for evaluation and management of open wound on the right ankle.  She has had wounds in this area for several years on and off.  The current wound opened up few months ago and has she has been going through treatment through her PCP and has not been healing and hence was referred to us for further management.  Currently using mupirocin ointment.  The recent some periwound redness which seems more like reactionary erythema-there is no malodor/purulent drainage/fever.  She does have stigmata of chronic venous hypertension including dilated varicose veins on bilateral lower extremities, spider veins, loss of hair, and skin discoloration.    6-18-25 (per sj):  Patient switching providers due to insurance issues.  Prior to 5-14 patient was seeing marvin espitia with [facility].  She has been referred to vein clinic by dr. Waldrop. She did not make an appointment yet as she was concerned about insurance coverage. I will give her [facility] vascular number and asked her to call and get an appointment with dr. Najjar or Steph  she also has 30-40mmhg stockings for post healing that were ordered. Epifix is not covered.  Patient states that prior to the ulceration occurring she may have been getting lax with wearing stockings during the day. She was wearing 10-20mmhg. Today there is not any s/s of  infection. We discussed addressing the \"internal\" vein issues as well as supporting with \"external\" healing and compression. Patient will bring her stockings next week and we will come up with treatment plan that she can managed independently with her stockings. Patient is able to ambulate without difficulty.    6-25-25 Patient returns. She states vascular called her but she has not made an appointment yet. Patient encouraged to do so. She did bring her 2 layer stocking and it is on the large side of the sizing, but is ok/adequate.  Her wound edges are macerated, but there is definite bridges of epi dividing the wound in parts.  There is also hypergran which was tx with agno3, topical steroid and hydrofera transfer as the dressing.  No c/o pain or s/s of infection.    7-2-25 patient returns.  She still has not made an appointment with vascular.  She is moving her parents today into assisted living so has been busy.  We discussed that she does not have any labs, she states she has not had them drawn for some time. Will order basic labs. Today her drainage did go through the wrap. She was on her feet a lot last weekend. The surrounding periwound is errythematous and does not fade with elevation. Will culture and start her on topical abx and will transition her into her stockings. Will wait culture results and sensitivities before rxing po abx. Patient is agreeable.   MEDICATIONS:   Medications - Current[1]  ALLERGIES:   Allergies[2]   REVIEW OF SYSTEMS:   This information was obtained from the patient/family and chart.    See HPI for pertinent positives, otherwise 10 pt ROS negative.    HISTORY:   Past Medical History[3]  Past Surgical History[4]    Short Social Hx on File[5]  PHYSICAL EXAM:     Vitals:    07/02/25 0700   BP: 138/82   Pulse: 79   Resp: 14   Temp: 98.6 °F (37 °C)          Estimated body mass index is 30.34 kg/m² as calculated from the following:    Height as of 5/14/25: 66\".    Weight as of 5/14/25: 188 lb (85.3 kg).   No results found for: \"PGLU\"    Vital signs reviewed.Appears stated age, well groomed.    Constitutional:  Bp wnl for patient. Pulse Regular and wnl for patient. Respirations easy and unlabored. Temperature wnl. Elevated bmi. Appearance neat and clean. Appears in no acute distress. Well nourished and well developed.    Lower extremity:  dp/pt palpable left. Left lower extremity + for varicosities, corona phlebectica, edema is stable. Capillary refill < 3 seconds. Digits are warm. toenails are wnl for color, thickness and hygeine. Skin hydration wnl. no hairgrowth on leg.    Musculoskeletal:  Gait and station stable   Integumentary:  refer to wound characteristics and images   Psychiatric:  Judgment and insight intact. Alert and oriented times 3. No evidence of depression, anxiety, or agitation. Calm, cooperative, and communicative. Appropriate interactions and affect.  EDEMA:   Calf     Point of Measurement - Right Calf: 38        Right Calf from:: Heel  Right Calf cm:: 37.2  Ankle     Point of Measurement - Right Ankle: 10           Right Ankle from:: Heel  Right Ankle cm:: 20.4     DIAGNOSTICS:     No results found for: \"BUN\", \"CREATSERUM\", \"GFRCKDEPI\", \"ALB\", \"TP\", \"PREALB\", \"A1C\"    WOUND ASSESSMENT:     Wound 05/14/25 #1 Right Medial Ankle Ankle Right;Medial (Active)   Date First Assessed/Time First Assessed: 05/14/25 0907    Wound Number (Wound Clinic Only): #1 Right Medial Ankle  Primary Wound Type: Venous Ulcer  Location: Ankle  Wound Location Orientation: Right;Medial      Assessments 5/14/2025  9:12 AM 7/2/2025  7:29 AM   Wound Image        Drainage Amount Large Large   Drainage Description Serosanguineous  Serosanguineous   Treatments Compression (unna boot 30-40mmhg) --   Wound Length (cm) 2.9 cm 3.5 cm (slight angle)   Wound Width (cm) 2.5 cm 2.1 cm   Wound Surface Area (cm^2) 5.69 cm^2 5.77 cm^2   Wound Depth (cm) 0.1 cm 0.2 cm   Wound Volume (cm^3) 0.38 cm^3 0.77 cm^3   Wound Healing % -- -103   Margins Well-defined edges Well-defined edges   Non-staged Wound Description Full thickness Full thickness   Katty-wound Assessment Moist;Edema Moist;Edema   Wound Granulation Tissue Red;Pink;Firm Red;Spongy   Wound Bed Granulation (%) 30 % 30 %   Wound Bed Epithelium (%) 35 % 15 %   Wound Bed Slough (%) 35 % 55 %   Wound Odor None None   Shape Bridged bridged   Tunneling? No --   Undermining? No --   Sinus Tracts? No --       Inactive Orders   Date Order Priority Status Authorizing Provider   06/11/25 0825 Debridement Venous Ulcer Right;Medial Ankle Routine Completed Francesco Heredia MD   05/28/25 0842 Debridement Venous Ulcer Right;Medial Ankle Routine Completed Francesco Heredia MD   05/14/25 0952 Debridement Venous Ulcer Right;Medial Ankle Routine Completed Francesco Heredia MD       Compression Wrap 05/14/25 Ankle Anterior;Right (Active)   Placement Date: 05/14/25   Location: Ankle  Wound Location Orientation: Anterior;Right      Assessments 5/14/2025 10:18 AM 6/25/2025  7:58 AM   Response to Treatment Well tolerated Well tolerated   Compression Layers Multilayer Multilayer   Compression Product Type Unna Boot (30-40mmhg) Unna Boot (calamine unna boot 30-40mmHg)   Dressing Applied Yes Yes (hydrofera transfer, abd pad)   Compression Wrap Location Toes to Knee Toes to Knee   Compression Wrap Status Clean;Dry;Intact Clean;Dry;Intact       No associated orders.              ASSESSMENT AND PLAN:      1. Non-pressure chronic ulcer of right ankle with fat layer exposed (HCC)  - CBC W Differential W Platelet; Future  - Comp Metabolic Panel (14); Future  - Hemoglobin A1C  - Aerobic Bacterial Culture    2. Idiopathic  chronic venous hypertension of right lower extremity with ulcer and inflammation (HCC)  - CBC W Differential W Platelet; Future  - Comp Metabolic Panel (14); Future  - Hemoglobin A1C  - Aerobic Bacterial Culture          Risks, benefits, and alternatives of current treatment plan discussed in detail.  Questions and concerns addressed. Red flags to RTC or ED reviewed.  Patient (or parent) agrees to plan.      NOTE TO PATIENT: The 21st Century Cures Act makes clinical notes like these available to patients in the interest of transparency. Clinical notes are medical documents used by physicians and care providers to communicate with each other. These documents include medical language and terminology, abbreviations, and treatment information that may sound technical and at times possibly unfamiliar. In addition, at times, the verbiage may appear blunt or direct. These documents are one tool providers use to communicate relevant information and clinical opinions of the care providers in a way that allows common understanding of the clinical context.   I spent  40 minutes with the patient. This time included:    preparing to see the patient (eg, review notes and recent diagnostics),  seeing the patient, obtaining and/or reviewing separately obtained history, performing a medically appropriate examination and/or evaluation, counseling and educating the patient, documenting in the record, cx, rx, labs  DISCHARGE:      Patient Instructions   Please return:  1 week    Things to do  Dr. Samer Najjar or Dr. Selena Choi  [phone]     Laboratory (blood draw) orders:  Orders Placed This Encounter   Procedures    CBC W Differential W Platelet    Comp Metabolic Panel (14)    Hemoglobin A1C    Aerobic Bacterial Culture       Meds & Refills for this Visit:  Requested Prescriptions     Signed Prescriptions Disp Refills    gentamicin 0.1 % External Ointment 30 g 0     Sig: Apply 1 Application topically 2 (two) times daily for  14 days.       Patient discharge and wound care instructions  Zaria Monae  7/2/2025        Changing your dressing:              Wash your hands with soap and water.  Ensure that the old dressing is removed completely. Place it in a plastic bag and throw it in the trash.  Cleanse the wound with hypochlorous wound cleanser (ie. Anasept, vashe, pure and clean).  It's ok to “scrub” your wound with the gauze, small amount of bleeding with cleansing is normal and ok.  Apply the following dressings:  gentamicin>cover dressing      Compression Therapy :   2 layer compression    Compression Therapy Instructions:  1.   Okay to wear overnight    2.  Avoid prolonged standing in one place.  It is better to have your calf muscles moving       to pump fluid out of the legs.  3.  Elevate leg(s) above the level of the heart when sitting or as much as possible.  4.  Take your diuretics as directed by your provider.  Do not skip doses or change doses      unless instructed to do so by your provider.  5. Do not get leg(s) with compression wrap wet. If wraps are too tight as indicated        By pain, numbness/tingling or discoloration of toes remove wrap completely       and call the   wound center.     Miscellaneous Instructions:  Supplement with a daily multivitamin   Low salt diet  Increase protein intake / consider protein supplements - see below  Elevate extremities at all times when sitting / laying down.    DIETARY MODIFICATIONS TO HELP WITH WOUND HEALING:    Protein: Meats, beans, eggs, milk and yogurt particularly Greek yogurt), tofu, soy nuts, soy protein products  Vitamin C: Citrus fruits and juices, strawberries, tomatoes, tomato juice, peppers, baked potatoes, spinach, broccoli, cauliflower, [place] sprouts, cabbage  Vitamin A: Dark green, leafy vegetables, orange or yellow vegetables, cantaloupe, fortified dairy products, liver, fortified cereals  Zinc: Fortified cereals, red meats, seafood  Consider Izaiah by [facility]  (These are essential branch chain amino acids that help with tissue building and wound healing) and take 2 packets/day. you can order online at [employer] or [employer]    ADDITIONAL REMINDERS:  The treatment plan has been discussed at length with you and your provider. Follow all instructions carefully, it is very important. If you do not follow all instructions, you are at  risk of your wound not healing, infection, possible loss of limb and even end of life.  Please call the clinic during regular business hours ( 7:30 AM - 5:30 PM) if you notice increased bleeding, redness, warmth, pain or pus like drainage or start running a fever greater than 100.3.    For after hour emergencies, please call your primary physician or go to the nearest emergency room.   Yancy Bhatti FNP-C, CWCN-AP, CFCN, CSWS, WCC, DWC  7/2/2025            [1]   Current Outpatient Medications:     gentamicin 0.1 % External Ointment, Apply 1 Application topically 2 (two) times daily for 14 days., Disp: 30 g, Rfl: 0    mupirocin 2 % External Ointment, Apply 1 Application topically 3 (three) times daily. (Patient not taking: Reported on 5/14/2025), Disp: 15 g, Rfl: 1    mupirocin 2 % External Ointment, Apply to affect lesions tid (Patient not taking: Reported on 5/14/2025), Disp: 30 g, Rfl: 1  [2] No Known Allergies  [3] History reviewed. No pertinent past medical history.  [4] History reviewed. No pertinent surgical history.  [5]   Social History  Socioeconomic History    Marital status:    Tobacco Use    Smoking status: Never     Passive exposure: Never    Smokeless tobacco: Never   Vaping Use    Vaping status: Never Used   Substance and Sexual Activity    Alcohol use: Yes     Comment: wine once in awhile    Drug use: No     Social Drivers of Health     Food Insecurity: No Food Insecurity (3/25/2025)    NCSS - Food Insecurity     Worried About Running Out of Food in the Last Year: No     Ran Out of Food in the Last Year: No   Transportation  Needs: No Transportation Needs (3/25/2025)    NCSS - Transportation     Lack of Transportation: No   Housing Stability: Not At Risk (3/25/2025)    NCSS - Housing/Utilities     Has Housing: Yes     Worried About Losing Housing: No     Unable to Get Utilities: No

## 2025-07-02 ENCOUNTER — HOSPITAL ENCOUNTER (OUTPATIENT)
Dept: LAB | Facility: HOSPITAL | Age: 65
Discharge: HOME | End: 2025-07-02
Attending: NURSE PRACTITIONER
Payer: COMMERCIAL

## 2025-07-02 ENCOUNTER — OFFICE VISIT (OUTPATIENT)
Dept: WOUND CARE | Facility: HOSPITAL | Age: 65
End: 2025-07-02
Attending: INTERNAL MEDICINE
Payer: COMMERCIAL

## 2025-07-02 VITALS
TEMPERATURE: 99 F | RESPIRATION RATE: 14 BRPM | DIASTOLIC BLOOD PRESSURE: 82 MMHG | SYSTOLIC BLOOD PRESSURE: 138 MMHG | HEART RATE: 79 BPM

## 2025-07-02 DIAGNOSIS — L97.312 NON-PRESSURE CHRONIC ULCER OF RIGHT ANKLE WITH FAT LAYER EXPOSED (HCC): ICD-10-CM

## 2025-07-02 DIAGNOSIS — I87.331 IDIOPATHIC CHRONIC VENOUS HYPERTENSION OF RIGHT LOWER EXTREMITY WITH ULCER AND INFLAMMATION (HCC): ICD-10-CM

## 2025-07-02 DIAGNOSIS — L97.312 NON-PRESSURE CHRONIC ULCER OF RIGHT ANKLE WITH FAT LAYER EXPOSED (HCC): Primary | ICD-10-CM

## 2025-07-02 LAB
ALBUMIN SERPL-MCNC: 4.3 G/DL (ref 3.2–4.8)
ALBUMIN/GLOB SERPL: 1.6 {RATIO} (ref 1–2)
ALP LIVER SERPL-CCNC: 59 U/L (ref 50–130)
ALT SERPL-CCNC: 13 U/L (ref 10–49)
ANION GAP SERPL CALC-SCNC: 8 MMOL/L (ref 0–18)
AST SERPL-CCNC: 18 U/L (ref ?–34)
BASOPHILS # BLD AUTO: 0.03 X10(3) UL (ref 0–0.2)
BASOPHILS NFR BLD AUTO: 0.5 %
BILIRUB SERPL-MCNC: 0.5 MG/DL (ref 0.2–1.1)
BUN BLD-MCNC: 15 MG/DL (ref 9–23)
CALCIUM BLD-MCNC: 9.3 MG/DL (ref 8.7–10.6)
CHLORIDE SERPL-SCNC: 108 MMOL/L (ref 98–112)
CO2 SERPL-SCNC: 24 MMOL/L (ref 21–32)
CREAT BLD-MCNC: 0.87 MG/DL (ref 0.55–1.02)
EGFRCR SERPLBLD CKD-EPI 2021: 75 ML/MIN/1.73M2 (ref 60–?)
EOSINOPHIL # BLD AUTO: 0.14 X10(3) UL (ref 0–0.7)
EOSINOPHIL NFR BLD AUTO: 2.6 %
ERYTHROCYTE [DISTWIDTH] IN BLOOD BY AUTOMATED COUNT: 13.1 %
EST. AVERAGE GLUCOSE BLD GHB EST-MCNC: 120 MG/DL (ref 68–126)
GLOBULIN PLAS-MCNC: 2.7 G/DL (ref 2–3.5)
GLUCOSE BLD-MCNC: 109 MG/DL (ref 70–99)
HBA1C MFR BLD: 5.8 % (ref ?–5.7)
HCT VFR BLD AUTO: 40.3 % (ref 35–48)
HGB BLD-MCNC: 12.9 G/DL (ref 12–16)
IMM GRANULOCYTES # BLD AUTO: 0.01 X10(3) UL (ref 0–1)
IMM GRANULOCYTES NFR BLD: 0.2 %
LYMPHOCYTES # BLD AUTO: 1.6 X10(3) UL (ref 1–4)
LYMPHOCYTES NFR BLD AUTO: 29.3 %
MCH RBC QN AUTO: 30.2 PG (ref 26–34)
MCHC RBC AUTO-ENTMCNC: 32 G/DL (ref 31–37)
MCV RBC AUTO: 94.4 FL (ref 80–100)
MONOCYTES # BLD AUTO: 0.52 X10(3) UL (ref 0.1–1)
MONOCYTES NFR BLD AUTO: 9.5 %
NEUTROPHILS # BLD AUTO: 3.17 X10 (3) UL (ref 1.5–7.7)
NEUTROPHILS # BLD AUTO: 3.17 X10(3) UL (ref 1.5–7.7)
NEUTROPHILS NFR BLD AUTO: 57.9 %
OSMOLALITY SERPL CALC.SUM OF ELEC: 291 MOSM/KG (ref 275–295)
PLATELET # BLD AUTO: 275 10(3)UL (ref 150–450)
POTASSIUM SERPL-SCNC: 4.6 MMOL/L (ref 3.5–5.1)
PROT SERPL-MCNC: 7 G/DL (ref 5.7–8.2)
RBC # BLD AUTO: 4.27 X10(6)UL (ref 3.8–5.3)
SODIUM SERPL-SCNC: 140 MMOL/L (ref 136–145)
WBC # BLD AUTO: 5.5 X10(3) UL (ref 4–11)

## 2025-07-02 PROCEDURE — 85025 COMPLETE CBC W/AUTO DIFF WBC: CPT

## 2025-07-02 PROCEDURE — 36415 COLL VENOUS BLD VENIPUNCTURE: CPT

## 2025-07-02 PROCEDURE — 80053 COMPREHEN METABOLIC PANEL: CPT

## 2025-07-02 PROCEDURE — 99215 OFFICE O/P EST HI 40 MIN: CPT | Performed by: NURSE PRACTITIONER

## 2025-07-02 RX ORDER — GENTAMICIN SULFATE 1 MG/G
1 OINTMENT TOPICAL 2 TIMES DAILY
Qty: 30 G | Refills: 0 | Status: SHIPPED | OUTPATIENT
Start: 2025-07-02 | End: 2025-07-16

## 2025-07-02 NOTE — PROGRESS NOTES
Called patient to review lab results. Received an identifiable VM inbox. Informed patient that overall labs look great, however she is over the cutoff for prediabetes. Informed that liver labs and kidneys look good. Notified that labs were sent to PCP office as well and that NP with Dr. Wen would like her to schedule a yearly physical. Provided wound clinic phone number [phone] should she have any further questions.

## 2025-07-02 NOTE — PROGRESS NOTES
.Weekly Wound Education Note    Teaching Provided To: Patient  Training Topics: Discharge instructions, Dressing, Edema control, Compression  Training Method: Explain/Verbal, Written  Training Response: Patient responds and understands            Bacitracin ointment used in clinic.  Gentamicin ointment ordered, to be applied twice daily and cover with dry dressing.  Transitioned into compression socks.  Wound cultured this visit.

## 2025-07-02 NOTE — PATIENT INSTRUCTIONS
Please return:  1 week    Things to do  Dr. Samer Najjar or Dr. Selena Choi  [phone]     Laboratory (blood draw) orders:  Orders Placed This Encounter   Procedures    CBC W Differential W Platelet    Comp Metabolic Panel (14)    Hemoglobin A1C    Aerobic Bacterial Culture       Meds & Refills for this Visit:  Requested Prescriptions     Signed Prescriptions Disp Refills    gentamicin 0.1 % External Ointment 30 g 0     Sig: Apply 1 Application topically 2 (two) times daily for 14 days.       Patient discharge and wound care instructions  Zaria Monae  7/2/2025        Changing your dressing:              Wash your hands with soap and water.  Ensure that the old dressing is removed completely. Place it in a plastic bag and throw it in the trash.  Cleanse the wound with hypochlorous wound cleanser (ie. Anasept, vashe, pure and clean).  It's ok to “scrub” your wound with the gauze, small amount of bleeding with cleansing is normal and ok.  Apply the following dressings:  gentamicin>cover dressing      Compression Therapy :   2 layer compression    Compression Therapy Instructions:  1.   Okay to wear overnight    2.  Avoid prolonged standing in one place.  It is better to have your calf muscles moving       to pump fluid out of the legs.  3.  Elevate leg(s) above the level of the heart when sitting or as much as possible.  4.  Take your diuretics as directed by your provider.  Do not skip doses or change doses      unless instructed to do so by your provider.  5. Do not get leg(s) with compression wrap wet. If wraps are too tight as indicated        By pain, numbness/tingling or discoloration of toes remove wrap completely       and call the   wound center.     Miscellaneous Instructions:  Supplement with a daily multivitamin   Low salt diet  Increase protein intake / consider protein supplements - see below  Elevate extremities at all times when sitting / laying down.    DIETARY MODIFICATIONS TO HELP WITH WOUND  HEALING:    Protein: Meats, beans, eggs, milk and yogurt particularly Greek yogurt), tofu, soy nuts, soy protein products  Vitamin C: Citrus fruits and juices, strawberries, tomatoes, tomato juice, peppers, baked potatoes, spinach, broccoli, cauliflower, [place] sprouts, cabbage  Vitamin A: Dark green, leafy vegetables, orange or yellow vegetables, cantaloupe, fortified dairy products, liver, fortified cereals  Zinc: Fortified cereals, red meats, seafood  Consider Izaiah by [employer] (These are essential branch chain amino acids that help with tissue building and wound healing) and take 2 packets/day. you can order online at [employer] or [employer]    ADDITIONAL REMINDERS:  The treatment plan has been discussed at length with you and your provider. Follow all instructions carefully, it is very important. If you do not follow all instructions, you are at  risk of your wound not healing, infection, possible loss of limb and even end of life.  Please call the clinic during regular business hours ( 7:30 AM - 5:30 PM) if you notice increased bleeding, redness, warmth, pain or pus like drainage or start running a fever greater than 100.3.    For after hour emergencies, please call your primary physician or go to the nearest emergency room.

## 2025-07-07 NOTE — PROGRESS NOTES
Writer reviewed culture results with patient.  She will continue to apply Gentamicin to wound twice daily.  She stated her pain has decreased and the wound looks good.

## 2025-07-08 NOTE — PROGRESS NOTES
CHIEF COMPLAINT:     Chief Complaint   Patient presents with    Wound Care     Patients is here for a follow up. Patients stated no issue at this moment        HPI:   Information obtained from Patient and chart  4-7-2020 (Per marvin espitia/[facility]):Patient is 60 year old woman with known varicose veins with venous ulcer of right lower ext for more than 8 weeks. 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 Patient stated she has compression stocking 20 mmHg, unable to wear them due to wound/pain in the past two months. Patient has had seen a vascular surgeon years ago, PCP placed a referral for re-evaluation. Patient was prescribed mupirocin 2 % External Ointment by PCP.  Patient was referred to wound care clinic by her PCP Magdalena SKINNER.    5-14-25 INITIAL (per SP):  65-year-old  female here for evaluation and management of open wound on the right ankle.  She has had wounds in this area for several years on and off.  The current wound opened up few months ago and has she has been going through treatment through her PCP and has not been healing and hence was referred to us for further management.  Currently using mupirocin ointment.  The recent some periwound redness which seems more like reactionary erythema-there is no malodor/purulent drainage/fever.  She does have stigmata of chronic venous hypertension including dilated varicose veins on bilateral lower extremities, spider veins, loss of hair, and skin discoloration.    6-18-25 (per sj):  Patient switching providers due to insurance issues.  Prior to 5-14 patient was seeing marvin espitia with [facility].  She has been referred to vein clinic by dr. Waldrop. She did not make an appointment yet as she was concerned about insurance coverage. I will give her [facility] vascular number and asked her to call and get an appointment with dr. Najjar or Steph, she also has 30-40mmhg stockings for  post healing that were ordered. Epifix is not covered.  Patient states that prior to the ulceration occurring she may have been getting lax with wearing stockings during the day. She was wearing 10-20mmhg. Today there is not any s/s of  infection. We discussed addressing the \"internal\" vein issues as well as supporting with \"external\" healing and compression. Patient will bring her stockings next week and we will come up with treatment plan that she can managed independently with her stockings. Patient is able to ambulate without difficulty.    6-25-25 Patient returns. She states vascular called her but she has not made an appointment yet. Patient encouraged to do so. She did bring her 2 layer stocking and it is on the large side of the sizing, but is ok/adequate.  Her wound edges are macerated, but there is definite bridges of epi dividing the wound in parts.  There is also hypergran which was tx with agno3, topical steroid and hydrofera transfer as the dressing.  No c/o pain or s/s of infection.    7-2-25 patient returns.  She still has not made an appointment with vascular.  She is moving her parents today into assisted living so has been busy.  We discussed that she does not have any labs, she states she has not had them drawn for some time. Will order basic labs. Today her drainage did go through the wrap. She was on her feet a lot last weekend. The surrounding periwound is errythematous and does not fade with elevation. Will culture and start her on topical abx and will transition her into her stockings. Will wait culture results and sensitivities before rxing po abx. Patient is agreeable.     7-9-25 patient returns.  Labs she got drawn last week were wnl, her A1c is 5.8.  her cx grew staph sensitive to gentamicin patient instructed to continue that no need for po abx. Patient reported that her pain had improved and wound was improving.  Today she states her pain is much improved, the wound is also improved.   Her edema is well managed. We discussed continuing with the gent for another week.  I reminded her to make an appointment with vascular.  MEDICATIONS:   Medications - Current[1]  ALLERGIES:   Allergies[2]   REVIEW OF SYSTEMS:   This information was obtained from the patient/family and chart.    See HPI for pertinent positives, otherwise 10 pt ROS negative.    HISTORY:   Past Medical History[3]  Past Surgical History[4]   Short Social Hx on File[5]  PHYSICAL EXAM:     Vitals:    07/09/25 0700   BP: 141/79   Pulse: 79   Resp: 16   Temp: 97.8 °F (36.6 °C)            Estimated body mass index is 30.34 kg/m² as calculated from the following:    Height as of 5/14/25: 66\".    Weight as of 5/14/25: 188 lb (85.3 kg).   No results found for: \"PGLU\"    Vital signs reviewed.Appears stated age, well groomed.    Constitutional:  Bp wnl for patient. Pulse Regular and wnl for patient. Respirations easy and unlabored. Temperature wnl. Elevated bmi. Appearance neat and clean. Appears in no acute distress. Well nourished and well developed.    Lower extremity:  dp/pt palpable left. Left lower extremity + for varicosities, corona phlebectica, edema is stable. Capillary refill < 3 seconds. Digits are warm. toenails are wnl for color, thickness and hygeine. Skin hydration wnl. no hairgrowth on leg.    Musculoskeletal:  Gait and station stable   Integumentary:  refer to wound characteristics and images   Psychiatric:  Judgment and insight intact. Alert and oriented times 3. No evidence of depression, anxiety, or agitation. Calm, cooperative, and communicative. Appropriate interactions and affect.  EDEMA:   Calf     Point of Measurement - Right Calf: 38        Right Calf from:: Heel  Right Calf cm:: 37.5  Ankle     Point of Measurement - Right Ankle: 10           Right Ankle from:: Heel  Right Ankle cm:: 19.4     DIAGNOSTICS:     Lab Results   Component Value Date    BUN 15 07/02/2025    CREATSERUM 0.87 07/02/2025    ALB 4.3 07/02/2025     TP 7.0 07/02/2025    A1C 5.8 (H) 07/02/2025       WOUND ASSESSMENT:     Wound 05/14/25 #1 Right Medial Ankle Ankle Right;Medial (Active)   Date First Assessed/Time First Assessed: 05/14/25 0907    Wound Number (Wound Clinic Only): #1 Right Medial Ankle  Primary Wound Type: Venous Ulcer  Location: Ankle  Wound Location Orientation: Right;Medial      Assessments 5/14/2025  9:12 AM 7/9/2025  7:59 AM   Wound Image        Drainage Amount Large Scant   Drainage Description Serosanguineous Serosanguineous   Treatments Compression (unna boot 30-40mmhg) --   Wound Length (cm) 2.9 cm 3.5 cm (slightangle   slight angle)   Wound Width (cm) 2.5 cm 1.5 cm   Wound Surface Area (cm^2) 5.69 cm^2 4.12 cm^2   Wound Depth (cm) 0.1 cm 0.1 cm   Wound Volume (cm^3) 0.38 cm^3 0.275 cm^3   Wound Healing % -- 28   Margins Well-defined edges Well-defined edges   Non-staged Wound Description Full thickness Full thickness   Katty-wound Assessment Moist;Edema Moist;Maceration   Wound Granulation Tissue Red;Pink;Firm Firm;Pink   Wound Bed Granulation (%) 30 % 75 %   Wound Bed Epithelium (%) 35 % 20 %   Wound Bed Slough (%) 35 % 5 %   Wound Odor None None   Shape Bridged clustered   Tunneling? No --   Undermining? No --   Sinus Tracts? No --       Inactive Orders   Date Order Priority Status Authorizing Provider   06/11/25 0825 Debridement Venous Ulcer Right;Medial Ankle Routine Completed Francesco Heredia MD   05/28/25 0842 Debridement Venous Ulcer Right;Medial Ankle Routine Completed Francesco Heredia MD   05/14/25 0952 Debridement Venous Ulcer Right;Medial Ankle Routine Completed Francesco Heredia MD       Compression Wrap 05/14/25 Ankle Anterior;Right (Active)   Placement Date: 05/14/25   Location: Ankle  Wound Location Orientation: Anterior;Right      Assessments 5/14/2025 10:18 AM 6/25/2025  7:58 AM   Response to Treatment Well tolerated Well tolerated   Compression Layers Multilayer Multilayer   Compression Product Type Unna Boot  (30-40mmhg) Unna Boot (calamine unna boot 30-40mmHg)   Dressing Applied Yes Yes (hydrofera transfer, abd pad)   Compression Wrap Location Toes to Knee Toes to Knee   Compression Wrap Status Clean;Dry;Intact Clean;Dry;Intact       No associated orders.              ASSESSMENT AND PLAN:      1. 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 Idiopathic chronic venous hypertension of right lower extremity with ulcer and inflammation (HCC)    3. Infection due to non-invasive methicillin sensitive Staphylococcus aureus            Risks, benefits, and alternatives of current treatment plan discussed in detail.  Questions and concerns addressed. Red flags to RTC or ED reviewed.  Patient (or parent) agrees to plan.      NOTE TO PATIENT: The 21st Century Cures Act makes clinical notes like these available to patients in the interest of transparency. Clinical notes are medical documents used by physicians and care providers to communicate with each other. These documents include medical language and terminology, abbreviations, and treatment information that may sound technical and at times possibly unfamiliar. In addition, at times, the verbiage may appear blunt or direct. These documents are one tool providers use to communicate relevant information and clinical opinions of the care providers in a way that allows common understanding of the clinical context.   I spent  25 minutes with the patient. This time included:    preparing to see the patient (eg, review notes and recent diagnostics),  seeing the patient, obtaining and/or reviewing separately obtained history, performing a medically appropriate examination and/or evaluation, counseling and educating the patient, documenting in the record,   DISCHARGE:      Patient Instructions   Please return:  1 week    Things to do  Dr. Samer Najjar or Dr. Selena Choi  [phone]     Patient discharge and wound care instructions  Zaria Garces  [facility]  7/9/2025           Changing your dressing:              Wash your hands with soap and water.  Ensure that the old dressing is removed completely. Place it in a plastic bag and throw it in the trash.  Cleanse the wound with hypochlorous wound cleanser (ie. Anasept, vashe, pure and clean).  It's ok to “scrub” your wound with the gauze, small amount of bleeding with cleansing is normal and ok.  Apply the following dressings:  gentamicin>cover dressing      Compression Therapy :    compression stockings    Compression Therapy Instructions:  1.   Okay to wear overnight    2.  Avoid prolonged standing in one place.  It is better to have your calf muscles moving       to pump fluid out of the legs.  3.  Elevate leg(s) above the level of the heart when sitting or as much as possible.  4.  Take your diuretics as directed by your provider.  Do not skip doses or change doses      unless instructed to do so by your provider.  5. Do not get leg(s) with compression wrap wet. If wraps are too tight as indicated        By pain, numbness/tingling or discoloration of toes remove wrap completely       and call the   wound center.     Miscellaneous Instructions:  Supplement with a daily multivitamin   Low salt diet  Increase protein intake / consider protein supplements - see below  Elevate extremities at all times when sitting / laying down.    DIETARY MODIFICATIONS TO HELP WITH WOUND HEALING:    Protein: Meats, beans, eggs, milk and yogurt particularly Greek yogurt), tofu, soy nuts, soy protein products  Vitamin C: Citrus fruits and juices, strawberries, tomatoes, tomato juice, peppers, baked potatoes, spinach, broccoli, cauliflower, [place] sprouts, cabbage  Vitamin A: Dark green, leafy vegetables, orange or yellow vegetables, cantaloupe, fortified dairy products, liver, fortified cereals  Zinc: Fortified cereals, red meats, seafood  Consider Izaiah by [employer] (These are essential branch chain amino acids that help with  tissue building and wound healing) and take 2 packets/day. you can order online at [employer] or [employer]    ADDITIONAL REMINDERS:  The treatment plan has been discussed at length with you and your provider. Follow all instructions carefully, it is very important. If you do not follow all instructions, you are at  risk of your wound not healing, infection, possible loss of limb and even end of life.  Please call the clinic during regular business hours ( 7:30 AM - 5:30 PM) if you notice increased bleeding, redness, warmth, pain or pus like drainage or start running a fever greater than 100.3.    For after hour emergencies, please call your primary physician or go to the nearest emergency room.   Yancy Bhatti FNP-C, CWCN-AP, CFCN, CSWS, WCC, DWC  7/9/2025            [1]   Current Outpatient Medications:     gentamicin 0.1 % External Ointment, Apply 1 Application topically 2 (two) times daily for 14 days., Disp: 30 g, Rfl: 0    mupirocin 2 % External Ointment, Apply 1 Application topically 3 (three) times daily. (Patient not taking: Reported on 5/14/2025), Disp: 15 g, Rfl: 1    mupirocin 2 % External Ointment, Apply to affect lesions tid (Patient not taking: Reported on 5/14/2025), Disp: 30 g, Rfl: 1  [2] No Known Allergies  [3] History reviewed. No pertinent past medical history.  [4] History reviewed. No pertinent surgical history.  [5]   Social History  Socioeconomic History    Marital status:    Tobacco Use    Smoking status: Never     Passive exposure: Never    Smokeless tobacco: Never   Vaping Use    Vaping status: Never Used   Substance and Sexual Activity    Alcohol use: Yes     Comment: wine once in awhile    Drug use: No     Social Drivers of Health     Food Insecurity: No Food Insecurity (3/25/2025)    NCSS - Food Insecurity     Worried About Running Out of Food in the Last Year: No     Ran Out of Food in the Last Year: No   Transportation Needs: No Transportation Needs (3/25/2025)    NCSS -  Transportation     Lack of Transportation: No   Housing Stability: Not At Risk (3/25/2025)    NCSS - Housing/Utilities     Has Housing: Yes     Worried About Losing Housing: No     Unable to Get Utilities: No

## 2025-07-09 ENCOUNTER — OFFICE VISIT (OUTPATIENT)
Dept: WOUND CARE | Facility: HOSPITAL | Age: 65
End: 2025-07-09
Attending: INTERNAL MEDICINE
Payer: COMMERCIAL

## 2025-07-09 VITALS
TEMPERATURE: 98 F | RESPIRATION RATE: 16 BRPM | DIASTOLIC BLOOD PRESSURE: 79 MMHG | SYSTOLIC BLOOD PRESSURE: 141 MMHG | HEART RATE: 79 BPM

## 2025-07-09 DIAGNOSIS — L97.312 NON-PRESSURE CHRONIC ULCER OF RIGHT ANKLE WITH FAT LAYER EXPOSED (HCC): Primary | ICD-10-CM

## 2025-07-09 DIAGNOSIS — I87.331 IDIOPATHIC CHRONIC VENOUS HYPERTENSION OF RIGHT LOWER EXTREMITY WITH ULCER AND INFLAMMATION (HCC): ICD-10-CM

## 2025-07-09 DIAGNOSIS — A49.01 INFECTION DUE TO NON-INVASIVE METHICILLIN SENSITIVE STAPHYLOCOCCUS AUREUS: ICD-10-CM

## 2025-07-09 PROCEDURE — 99213 OFFICE O/P EST LOW 20 MIN: CPT | Performed by: NURSE PRACTITIONER

## 2025-07-09 NOTE — PROGRESS NOTES
.Weekly Wound Education Note    Teaching Provided To: Patient  Training Topics: Discharge instructions, Dressing, Edema control, Compression, Cleasing and general instructions  Training Method: Explain/Verbal, Written  Training Response: Patient responds and understands            Continue Gentamicin ointment twice daily and cover with dry dressings.  Continue to wear compression socks.

## 2025-07-09 NOTE — PATIENT INSTRUCTIONS
Please return:  1 week    Things to do  Dr. Samer Najjar or Dr. Selena Choi  [phone]     Patient discharge and wound care instructions  Zarialala Garces Letha  7/9/2025           Changing your dressing:              Wash your hands with soap and water.  Ensure that the old dressing is removed completely. Place it in a plastic bag and throw it in the trash.  Cleanse the wound with hypochlorous wound cleanser (ie. Anasept, vashe, pure and clean).  It's ok to “scrub” your wound with the gauze, small amount of bleeding with cleansing is normal and ok.  Apply the following dressings:  gentamicin>cover dressing      Compression Therapy :    compression stockings    Compression Therapy Instructions:  1.   Okay to wear overnight    2.  Avoid prolonged standing in one place.  It is better to have your calf muscles moving       to pump fluid out of the legs.  3.  Elevate leg(s) above the level of the heart when sitting or as much as possible.  4.  Take your diuretics as directed by your provider.  Do not skip doses or change doses      unless instructed to do so by your provider.  5. Do not get leg(s) with compression wrap wet. If wraps are too tight as indicated        By pain, numbness/tingling or discoloration of toes remove wrap completely       and call the   wound center.     Miscellaneous Instructions:  Supplement with a daily multivitamin   Low salt diet  Increase protein intake / consider protein supplements - see below  Elevate extremities at all times when sitting / laying down.    DIETARY MODIFICATIONS TO HELP WITH WOUND HEALING:    Protein: Meats, beans, eggs, milk and yogurt particularly Greek yogurt), tofu, soy nuts, soy protein products  Vitamin C: Citrus fruits and juices, strawberries, tomatoes, tomato juice, peppers, baked potatoes, spinach, broccoli, cauliflower, [place] sprouts, cabbage  Vitamin A: Dark green, leafy vegetables, orange or yellow vegetables, cantaloupe, fortified dairy products, liver,  fortified cereals  Zinc: Fortified cereals, red meats, seafood  Consider Izaiah by [employer] (These are essential branch chain amino acids that help with tissue building and wound healing) and take 2 packets/day. you can order online at [employer] or [employer]    ADDITIONAL REMINDERS:  The treatment plan has been discussed at length with you and your provider. Follow all instructions carefully, it is very important. If you do not follow all instructions, you are at  risk of your wound not healing, infection, possible loss of limb and even end of life.  Please call the clinic during regular business hours ( 7:30 AM - 5:30 PM) if you notice increased bleeding, redness, warmth, pain or pus like drainage or start running a fever greater than 100.3.    For after hour emergencies, please call your primary physician or go to the nearest emergency room.

## 2025-07-15 NOTE — PROGRESS NOTES
CHIEF COMPLAINT:     Chief Complaint   Patient presents with    Wound Care     Patient arrives for a wound care follow up appointment. Patient arrives with her own compression. Patient is using Gentamicin.        HPI:   Information obtained from Patient and chart  4-7-2020 (Per marvin espitia/[facility]):Patient is 60 year old woman with known varicose veins with venous ulcer of right lower ext for more than 8 weeks. 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 Patient stated she has compression stocking 20 mmHg, unable to wear them due to wound/pain in the past two months. Patient has had seen a vascular surgeon years ago, PCP placed a referral for re-evaluation. Patient was prescribed mupirocin 2 % External Ointment by PCP.  Patient was referred to wound care clinic by her PCP Magdalena SKINNER.    5-14-25 INITIAL (per SP):  65-year-old  female here for evaluation and management of open wound on the right ankle.  She has had wounds in this area for several years on and off.  The current wound opened up few months ago and has she has been going through treatment through her PCP and has not been healing and hence was referred to us for further management.  Currently using mupirocin ointment.  The recent some periwound redness which seems more like reactionary erythema-there is no malodor/purulent drainage/fever.  She does have stigmata of chronic venous hypertension including dilated varicose veins on bilateral lower extremities, spider veins, loss of hair, and skin discoloration.    6-18-25 (per sj):  Patient switching providers due to insurance issues.  Prior to 5-14 patient was seeing marvin espitia with [facility].  She has been referred to vein clinic by dr. Waldrop. She did not make an appointment yet as she was concerned about insurance coverage. I will give her [facility] vascular number and asked her to call and get an appointment with   Najjar or Steph, she also has 30-40mmhg stockings for post healing that were ordered. Epifix is not covered.  Patient states that prior to the ulceration occurring she may have been getting lax with wearing stockings during the day. She was wearing 10-20mmhg. Today there is not any s/s of  infection. We discussed addressing the \"internal\" vein issues as well as supporting with \"external\" healing and compression. Patient will bring her stockings next week and we will come up with treatment plan that she can managed independently with her stockings. Patient is able to ambulate without difficulty.    6-25-25 Patient returns. She states vascular called her but she has not made an appointment yet. Patient encouraged to do so. She did bring her 2 layer stocking and it is on the large side of the sizing, but is ok/adequate.  Her wound edges are macerated, but there is definite bridges of epi dividing the wound in parts.  There is also hypergran which was tx with agno3, topical steroid and hydrofera transfer as the dressing.  No c/o pain or s/s of infection.    7-2-25 patient returns.  She still has not made an appointment with vascular.  She is moving her parents today into assisted living so has been busy.  We discussed that she does not have any labs, she states she has not had them drawn for some time. Will order basic labs. Today her drainage did go through the wrap. She was on her feet a lot last weekend. The surrounding periwound is errythematous and does not fade with elevation. Will culture and start her on topical abx and will transition her into her stockings. Will wait culture results and sensitivities before rxing po abx. Patient is agreeable.     7-9-25 patient returns.  Labs she got drawn last week were wnl, her A1c is 5.8.  her cx grew staph sensitive to gentamicin patient instructed to continue that no need for po abx. Patient reported that her pain had improved and wound was improving.  Today she states  her pain is much improved, the wound is also improved.  Her edema is well managed. We discussed continuing with the gent for another week.  I reminded her to make an appointment with vascular.    7-16-25 patient returns.  She has utilized gent topical for the last 2 weeks and has been in personal compression stockings. Her wound is improved, no pain.  She states she did call Vascular office, but is not wanting to make an appointment until her wound is healed. 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  Will utilize enluxtra and patient to change every 3 days. Continue with personal compression  MEDICATIONS:   Medications - Current[1]  ALLERGIES:   Allergies[2]   REVIEW OF SYSTEMS:   This information was obtained from the patient/family and chart.    See HPI for pertinent positives, otherwise 10 pt ROS negative.    HISTORY:   Past Medical History[3]  Past Surgical History[4]   Short Social Hx on File[5]  PHYSICAL EXAM:     Vitals:    07/16/25 0700   BP: 135/84   Pulse: 71   Resp: 15   Temp: 97.5 °F (36.4 °C)     Estimated body mass index is 30.34 kg/m² as calculated from the following:    Height as of 5/14/25: 66\".    Weight as of 5/14/25: 188 lb (85.3 kg).   No results found for: \"PGLU\"    Vital signs reviewed.Appears stated age, well groomed.    Constitutional:  Bp wnl for patient. Pulse Regular and wnl for patient. Respirations easy and unlabored. Temperature wnl. Elevated bmi. Appearance neat and clean. Appears in no acute distress. Well nourished and well developed.    Lower extremity:  dp/pt palpable left. Left lower extremity + for varicosities, corona phlebectica, edema with slight increase. Her lateral leg/knee with some ecchymosis. Capillary refill < 3 seconds. Digits are warm. toenails are wnl for color, thickness and hygeine. Skin hydration wnl. no hairgrowth on  leg.    Musculoskeletal:  Gait and station stable   Integumentary:  refer to wound characteristics and images   Psychiatric:  Judgment and insight intact. Alert and oriented times 3. No evidence of depression, anxiety, or agitation. Calm, cooperative, and communicative. Appropriate interactions and affect.  EDEMA:   Calf     Point of Measurement - Right Calf: 35        Right Calf from:: Heel  Right Calf cm:: 39.2  Ankle     Point of Measurement - Right Ankle: 10           Right Ankle from:: Heel  Right Ankle cm:: 20.2     DIAGNOSTICS:     Lab Results   Component Value Date    BUN 15 07/02/2025    CREATSERUM 0.87 07/02/2025    ALB 4.3 07/02/2025    TP 7.0 07/02/2025    A1C 5.8 (H) 07/02/2025       WOUND ASSESSMENT:     Wound 05/14/25 #1 Right Medial Ankle Ankle Right;Medial (Active)   Date First Assessed/Time First Assessed: 05/14/25 0907    Wound Number (Wound Clinic Only): #1 Right Medial Ankle  Primary Wound Type: Venous Ulcer  Location: Ankle  Wound Location Orientation: Right;Medial      Assessments 5/14/2025  9:12 AM 7/16/2025  7:41 AM   Wound Image        Drainage Amount Large Scant   Drainage Description Serosanguineous Serous;Yellow   Treatments Compression (unna boot 30-40mmhg) --   Wound Length (cm) 2.9 cm 3 cm (slight angle)   Wound Width (cm) 2.5 cm 1.9 cm   Wound Surface Area (cm^2) 5.69 cm^2 4.48 cm^2   Wound Depth (cm) 0.1 cm 0.1 cm   Wound Volume (cm^3) 0.38 cm^3 0.298 cm^3   Wound Healing % -- 22   Margins Well-defined edges Well-defined edges   Non-staged Wound Description Full thickness Full thickness   Katty-wound Assessment Moist;Edema Dry   Wound Granulation Tissue Red;Pink;Firm Red;Firm   Wound Bed Granulation (%) 30 % 40 %   Wound Bed Epithelium (%) 35 % 60 %   Wound Bed Slough (%) 35 % --   Wound Odor None None   Shape Bridged bridged   Tunneling? No No   Undermining? No No   Sinus Tracts? No No       Inactive Orders   Date Order Priority Status Authorizing Provider   06/11/25 0825  Debridement Venous Ulcer Right;Medial Ankle Routine Completed Francesco Heredia MD   05/28/25 0842 Debridement Venous Ulcer Right;Medial Ankle Routine Completed Francesco Heredia MD   05/14/25 0952 Debridement Venous Ulcer Right;Medial Ankle Routine Completed Francesco Heredia MD              ASSESSMENT AND PLAN:      1. 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 Idiopathic chronic venous hypertension of right lower extremity with ulcer and inflammation (HCC)    3. Traumatic ecchymosis of right lower leg, initial encounter      Risks, benefits, and alternatives of current treatment plan discussed in detail.  Questions and concerns addressed. Red flags to RTC or ED reviewed.  Patient (or parent) agrees to plan.      NOTE TO PATIENT: The 21st Century Cures Act makes clinical notes like these available to patients in the interest of transparency. Clinical notes are medical documents used by physicians and care providers to communicate with each other. These documents include medical language and terminology, abbreviations, and treatment information that may sound technical and at times possibly unfamiliar. In addition, at times, the verbiage may appear blunt or direct. These documents are one tool providers use to communicate relevant information and clinical opinions of the care providers in a way that allows common understanding of the clinical context.   I spent  29 minutes with the patient. This time included:    preparing to see the patient (eg, review notes and recent diagnostics),  seeing the patient, obtaining and/or reviewing separately obtained history, performing a medically appropriate examination and/or evaluation, counseling and educating the patient, documenting in the record,   DISCHARGE:      Patient Instructions   Please return:  1 week        Patient discharge and wound care instructions  Zaria Monae  7/16/2025        Changing your dressing:             Change every 3 days  Wash your hands with soap and water.  Ensure that the old dressing is removed completely. Place it in a plastic bag and throw it in the trash.  Cleanse the wound with hypochlorous wound cleanser (ie. Anasept, vashe, pure and clean).  It's ok to “scrub” your wound with the gauze, small amount of bleeding with cleansing is normal and ok.  Apply the following dressings:  enluxtra>secure with tegaderm    Compression Therapy :    compression stockings    Compression Therapy Instructions:  1.   Okay to wear overnight    2.  Avoid prolonged standing in one place.  It is better to have your calf muscles moving       to pump fluid out of the legs.  3.  Elevate leg(s) above the level of the heart when sitting or as much as possible.  4.  Take your diuretics as directed by your provider.  Do not skip doses or change doses      unless instructed to do so by your provider.  5. Do not get leg(s) with compression wrap wet. If wraps are too tight as indicated        By pain, numbness/tingling or discoloration of toes remove wrap completely       and call the   wound center.     Miscellaneous Instructions:  Supplement with a daily multivitamin   Low salt diet  Increase protein intake / consider protein supplements - see below  Elevate extremities at all times when sitting / laying down.    DIETARY MODIFICATIONS TO HELP WITH WOUND HEALING:    Protein: Meats, beans, eggs, milk and yogurt particularly Greek yogurt), tofu, soy nuts, soy protein products  Vitamin C: Citrus fruits and juices, strawberries, tomatoes, tomato juice, peppers, baked potatoes, spinach, broccoli, cauliflower, [place] sprouts, cabbage  Vitamin A: Dark green, leafy vegetables, orange or yellow vegetables, cantaloupe, fortified dairy products, liver, fortified cereals  Zinc: Fortified cereals, red meats, seafood  Consider Izaiah by [employer] (These are essential branch chain amino acids that help with tissue building and wound healing) and  take 2 packets/day. you can order online at [email or web address] or [email or web address]    ADDITIONAL REMINDERS:  The treatment plan has been discussed at length with you and your provider. Follow all instructions carefully, it is very important. If you do not follow all instructions, you are at  risk of your wound not healing, infection, possible loss of limb and even end of life.  Please call the clinic during regular business hours ( 7:30 AM - 5:30 PM) if you notice increased bleeding, redness, warmth, pain or pus like drainage or start running a fever greater than 100.3.    For after hour emergencies, please call your primary physician or go to the nearest emergency room.   Yancy Bhatti FNP-C, CWCN-AP, CFCN, CSWS, WCC, DWC  7/16/2025            [1]   Current Outpatient Medications:     gentamicin 0.1 % External Ointment, Apply 1 Application topically 2 (two) times daily for 14 days., Disp: 30 g, Rfl: 0    mupirocin 2 % External Ointment, Apply 1 Application topically 3 (three) times daily. (Patient not taking: Reported on 5/14/2025), Disp: 15 g, Rfl: 1    mupirocin 2 % External Ointment, Apply to affect lesions tid (Patient not taking: Reported on 5/14/2025), Disp: 30 g, Rfl: 1  [2] No Known Allergies  [3] History reviewed. No pertinent past medical history.  [4] History reviewed. No pertinent surgical history.  [5]   Social History  Socioeconomic History    Marital status:    Tobacco Use    Smoking status: Never     Passive exposure: Never    Smokeless tobacco: Never   Vaping Use    Vaping status: Never Used   Substance and Sexual Activity    Alcohol use: Yes     Comment: wine once in awhile    Drug use: No     Social Drivers of Health     Food Insecurity: No Food Insecurity (3/25/2025)    NCSS - Food Insecurity     Worried About Running Out of Food in the Last Year: No     Ran Out of Food in the Last Year: No   Transportation Needs: No Transportation Needs (3/25/2025)    NCSS - Transportation     Lack of Transportation: No    Housing Stability: Not At Risk (3/25/2025)    NCSS - Housing/Utilities     Has Housing: Yes     Worried About Losing Housing: No     Unable to Get Utilities: No

## 2025-07-16 ENCOUNTER — OFFICE VISIT (OUTPATIENT)
Dept: WOUND CARE | Facility: HOSPITAL | Age: 65
End: 2025-07-16
Attending: INTERNAL MEDICINE
Payer: COMMERCIAL

## 2025-07-16 VITALS
SYSTOLIC BLOOD PRESSURE: 135 MMHG | DIASTOLIC BLOOD PRESSURE: 84 MMHG | HEART RATE: 71 BPM | RESPIRATION RATE: 15 BRPM | TEMPERATURE: 98 F

## 2025-07-16 DIAGNOSIS — I87.331 IDIOPATHIC CHRONIC VENOUS HYPERTENSION OF RIGHT LOWER EXTREMITY WITH ULCER AND INFLAMMATION (HCC): ICD-10-CM

## 2025-07-16 DIAGNOSIS — S80.11XA TRAUMATIC ECCHYMOSIS OF RIGHT LOWER LEG, INITIAL ENCOUNTER: ICD-10-CM

## 2025-07-16 DIAGNOSIS — L97.312 NON-PRESSURE CHRONIC ULCER OF RIGHT ANKLE WITH FAT LAYER EXPOSED (HCC): Primary | ICD-10-CM

## 2025-07-16 PROCEDURE — 99213 OFFICE O/P EST LOW 20 MIN: CPT | Performed by: NURSE PRACTITIONER

## 2025-07-16 NOTE — PATIENT INSTRUCTIONS
Please return:  1 week        Patient discharge and wound care instructions  Zaria Monae  7/16/2025        Changing your dressing:            Change every 3 days  Wash your hands with soap and water.  Ensure that the old dressing is removed completely. Place it in a plastic bag and throw it in the trash.  Cleanse the wound with hypochlorous wound cleanser (ie. Anasept, vashe, pure and clean).  It's ok to “scrub” your wound with the gauze, small amount of bleeding with cleansing is normal and ok.  Apply the following dressings:  enluxtra>secure with tegaderm    Compression Therapy :    compression stockings    Compression Therapy Instructions:  1.   Okay to wear overnight    2.  Avoid prolonged standing in one place.  It is better to have your calf muscles moving       to pump fluid out of the legs.  3.  Elevate leg(s) above the level of the heart when sitting or as much as possible.  4.  Take your diuretics as directed by your provider.  Do not skip doses or change doses      unless instructed to do so by your provider.  5. Do not get leg(s) with compression wrap wet. If wraps are too tight as indicated        By pain, numbness/tingling or discoloration of toes remove wrap completely       and call the   wound center.     Miscellaneous Instructions:  Supplement with a daily multivitamin   Low salt diet  Increase protein intake / consider protein supplements - see below  Elevate extremities at all times when sitting / laying down.    DIETARY MODIFICATIONS TO HELP WITH WOUND HEALING:    Protein: Meats, beans, eggs, milk and yogurt particularly Greek yogurt), tofu, soy nuts, soy protein products  Vitamin C: Citrus fruits and juices, strawberries, tomatoes, tomato juice, peppers, baked potatoes, spinach, broccoli, cauliflower, [place] sprouts, cabbage  Vitamin A: Dark green, leafy vegetables, orange or yellow vegetables, cantaloupe, fortified dairy products, liver, fortified cereals  Zinc: Fortified cereals, red  meats, seafood  Consider Izaiah by [employer] (These are essential branch chain amino acids that help with tissue building and wound healing) and take 2 packets/day. you can order online at [email or web address] or [email or web address]    ADDITIONAL REMINDERS:  The treatment plan has been discussed at length with you and your provider. Follow all instructions carefully, it is very important. If you do not follow all instructions, you are at  risk of your wound not healing, infection, possible loss of limb and even end of life.  Please call the clinic during regular business hours ( 7:30 AM - 5:30 PM) if you notice increased bleeding, redness, warmth, pain or pus like drainage or start running a fever greater than 100.3.    For after hour emergencies, please call your primary physician or go to the nearest emergency room.

## 2025-07-16 NOTE — PROGRESS NOTES
.Weekly Wound Education Note    Teaching Provided To: Patient  Training Topics: Discharge instructions, Dressing, Edema control, Compression  Training Method: Explain/Verbal, Written  Training Response: Patient responds and understands            Enluxtra (back on) secured with tegaderm.  Patient continues wearing her compression stocking.

## 2025-07-23 ENCOUNTER — APPOINTMENT (OUTPATIENT)
Dept: WOUND CARE | Facility: HOSPITAL | Age: 65
End: 2025-07-23
Attending: INTERNAL MEDICINE
Payer: COMMERCIAL

## 2025-07-24 ENCOUNTER — TELEPHONE (OUTPATIENT)
Dept: WOUND CARE | Facility: HOSPITAL | Age: 65
End: 2025-07-24

## 2025-07-24 NOTE — TELEPHONE ENCOUNTER
Patient LVM having questions. Returned call - advised patient continue dressings as ordered at previous visit. She states she does have the dressings at home and is able to do change. All questions answered at this time. Confirmed appointment for next week 7/30/25 at 7:30am.

## 2025-07-30 ENCOUNTER — APPOINTMENT (OUTPATIENT)
Dept: WOUND CARE | Facility: HOSPITAL | Age: 65
End: 2025-07-30
Attending: INTERNAL MEDICINE
Payer: COMMERCIAL

## 2025-07-30 VITALS
SYSTOLIC BLOOD PRESSURE: 133 MMHG | DIASTOLIC BLOOD PRESSURE: 89 MMHG | RESPIRATION RATE: 16 BRPM | TEMPERATURE: 98 F | HEART RATE: 73 BPM

## 2025-07-30 DIAGNOSIS — I87.331 IDIOPATHIC CHRONIC VENOUS HYPERTENSION OF RIGHT LOWER EXTREMITY WITH ULCER AND INFLAMMATION (HCC): ICD-10-CM

## 2025-07-30 DIAGNOSIS — L97.312 NON-PRESSURE CHRONIC ULCER OF RIGHT ANKLE WITH FAT LAYER EXPOSED (HCC): Primary | ICD-10-CM

## 2025-07-30 PROCEDURE — 99214 OFFICE O/P EST MOD 30 MIN: CPT | Performed by: NURSE PRACTITIONER

## 2025-08-06 ENCOUNTER — OFFICE VISIT (OUTPATIENT)
Dept: WOUND CARE | Facility: HOSPITAL | Age: 65
End: 2025-08-06
Attending: INTERNAL MEDICINE

## 2025-08-06 VITALS
SYSTOLIC BLOOD PRESSURE: 143 MMHG | RESPIRATION RATE: 15 BRPM | TEMPERATURE: 98 F | DIASTOLIC BLOOD PRESSURE: 86 MMHG | HEART RATE: 75 BPM

## 2025-08-06 DIAGNOSIS — I87.331 IDIOPATHIC CHRONIC VENOUS HYPERTENSION OF RIGHT LOWER EXTREMITY WITH ULCER AND INFLAMMATION (HCC): ICD-10-CM

## 2025-08-06 DIAGNOSIS — L97.312 NON-PRESSURE CHRONIC ULCER OF RIGHT ANKLE WITH FAT LAYER EXPOSED (HCC): Primary | ICD-10-CM

## 2025-08-06 PROCEDURE — 99214 OFFICE O/P EST MOD 30 MIN: CPT | Performed by: NURSE PRACTITIONER

## 2025-08-13 ENCOUNTER — APPOINTMENT (OUTPATIENT)
Dept: WOUND CARE | Facility: HOSPITAL | Age: 65
End: 2025-08-13
Attending: INTERNAL MEDICINE

## 2025-08-20 ENCOUNTER — OFFICE VISIT (OUTPATIENT)
Dept: WOUND CARE | Facility: HOSPITAL | Age: 65
End: 2025-08-20
Attending: INTERNAL MEDICINE

## 2025-08-20 VITALS
SYSTOLIC BLOOD PRESSURE: 136 MMHG | HEART RATE: 82 BPM | TEMPERATURE: 98 F | DIASTOLIC BLOOD PRESSURE: 79 MMHG | RESPIRATION RATE: 16 BRPM

## 2025-08-20 DIAGNOSIS — L97.312 NON-PRESSURE CHRONIC ULCER OF RIGHT ANKLE WITH FAT LAYER EXPOSED (HCC): Primary | ICD-10-CM

## 2025-08-20 DIAGNOSIS — I87.331 IDIOPATHIC CHRONIC VENOUS HYPERTENSION OF RIGHT LOWER EXTREMITY WITH ULCER AND INFLAMMATION (HCC): ICD-10-CM

## 2025-08-20 PROCEDURE — 99213 OFFICE O/P EST LOW 20 MIN: CPT | Performed by: NURSE PRACTITIONER

## 2025-08-25 ENCOUNTER — TELEPHONE (OUTPATIENT)
Dept: WOUND CARE | Facility: HOSPITAL | Age: 65
End: 2025-08-25

## 2025-08-27 ENCOUNTER — APPOINTMENT (OUTPATIENT)
Dept: WOUND CARE | Facility: HOSPITAL | Age: 65
End: 2025-08-27
Attending: INTERNAL MEDICINE

## (undated) NOTE — LETTER
Date: 5/28/2025  Patient name: Zaria Monae  YOB: 1960  Medical Record Number: [id number]  Primary Coverage: Payor: [employer] / Plan: [employer]ANGE / Product Type: *No Product type* /   Secondary Coverage:   Insurance ID: [id number]  Patient Address: [address] [place] [id number]  Telephone Information:   Home Phone [phone]   Mobile [phone]         Encounter Date: 5/28/2025  Provider: Kassidy David MD  Diagnosis:     ICD-10-CM   1. Non-pressure chronic ulcer of right ankle with fat layer exposed (HCC)  L97.312   2. Idiopathic chronic venous hypertension of right lower extremity with ulcer and inflammation (HCC)  I87.331   3. Right leg swelling  M79.89   4. Sloughing of wound  R23.8   5. Chronic venous hypertension involving both sides  I87.303   6. Delayed wound healing  T14.8XXD       Progress Note:  [facility] PROGRESS NOTE  KASSIDY DAVID MD  5/28/2025    Chief Complaint:   Chief Complaint   Patient presents with    Wound Care     Patients is here for a follow up. Patients stated no issue at this moment        HPI:   Subjective   Zaria Monae is a 65 year old female coming in for a follow-up visit.    HPI    Wound looking good.  Dimensions are slightly smaller.  Wound base is covered with granulation, slough, advancing epithelium.    She has not made appointment with vein clinic yet.    Continues to have pain during debridement.    No signs of infection    Tolerating compression wraps  okay.    Review of Systems  Negative except HPI   Denies chest pain / SOB / palpitations  Denies fever.     Allergies  Allergies[1]    Current Meds:  Current Medications[2]      EXAM:     Objective   Objective    Physical Exam    Vital Signs  Vitals:    05/28/25 0756   BP: 148/79   Pulse: 90   Resp: 16   Temp: 97.8 °F (36.6 °C)       Wound Assessment  Wound 05/14/25 #1 Right Medial Ankle Ankle Right;Medial (Active)   Date First Assessed/Time First Assessed:  05/14/25 0907    Wound Number (Wound Clinic Only): #1 Right Medial Ankle  Primary Wound Type: Venous Ulcer  Location: Ankle  Wound Location Orientation: Right;Medial      Assessments 5/14/2025  9:12 AM 5/28/2025  7:55 AM   Wound Image        Drainage Amount Large Small   Drainage Description Serosanguineous Serosanguineous   Treatments Compression (unna boot 30-40mmhg) --   Wound Length (cm) 2.9 cm 3.7 cm   Wound Width (cm) 2.5 cm 2.5 cm   Wound Surface Area (cm^2) 5.69 cm^2 7.26 cm^2   Wound Depth (cm) 0.1 cm 0.1 cm   Wound Volume (cm^3) 0.38 cm^3 0.484 cm^3   Wound Healing % -- -27   Margins Well-defined edges Well-defined edges   Non-staged Wound Description Full thickness Full thickness   Katty-wound Assessment Moist;Edema Edema   Wound Granulation Tissue Red;Pink;Firm Firm;Red;Pink   Wound Bed Granulation (%) 30 % 10 %   Wound Bed Epithelium (%) 35 % 5 %   Wound Bed Slough (%) 35 % 85 %   Wound Odor None None   Shape Bridged bridged   Tunneling? No No   Undermining? No No   Sinus Tracts? No No       Inactive Orders   Date Order Priority Status Authorizing Provider   05/14/25 0952 Debridement Venous Ulcer Right;Medial Ankle Routine Completed Francesco Heredia MD       Compression Wrap 05/14/25 Ankle Anterior;Right (Active)   Placement Date: 05/14/25   Location: Ankle  Wound Location Orientation: Anterior;Right      Assessments 5/14/2025 10:18 AM 5/21/2025  9:33 AM   Response to Treatment Well tolerated Well tolerated   Compression Layers Multilayer Multilayer   Compression Product Type Unna Boot (30-40mmhg) Unna Boot (30-40mmhg)   Dressing Applied Yes Yes   Compression Wrap Location Toes to Knee Toes to Knee   Compression Wrap Status Clean;Dry;Intact Clean;Dry;Intact       No associated orders.                ASSESSMENT AND PLAN:     Assessment    Encounter Diagnosis  1. 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 Idiopathic chronic venous hypertension of right lower extremity  with ulcer and inflammation (HCC)    3. Right leg swelling    4. Sloughing of wound    5. Chronic venous hypertension involving both sides    6. Delayed wound healing            PROCEDURES:          PLAN OF CARE:      Watch out for signs of early infection - counseled.   Plan of care discussed with patient in detail - All questions answered   Return in one week.     Orders  No orders of the defined types were placed in this encounter.      Meds & Refills for this Visit:  Requested Prescriptions      No prescriptions requested or ordered in this encounter         Patient Instructions     Return one weekfor nurse visit  See me in 2 weeks  PA for skin subs  Schedule appt with vein clinic.     Wound Cleaning and Dressings:    Shower with protection.   Dressing changes only in the clinic   DRESSINGS: collagen / HF transfer / kerramax.   Change dressing weekly     Compression Therapy : Yes,  UNNA 30-40 mm hg with add on spandagrip    Compression Therapy Instructions:  1.   Okay to wear overnight      if comfortable.      2.  Avoid prolonged standing in one place.  It is better to have your calf muscles moving       to pump fluid out of the legs.    3.  Elevate leg(s) above the level of the heart when sitting or as much as possible.    4.  Take your diuretics as directed by your provider.  Do not skip doses or change doses      unless instructed to do so by your provider.    5. Do not get leg(s) with compression wrap wet. If wraps are too tight as indicated        By pain, numbness/tingling or discoloration of toes remove wrap completely       and call the   wound center.     There are no questions and answers to display.        Off-Loading:    Miscellaneous Instructions:  Supplement with a daily multivitamin   Low salt diet  Intense blood sugar control - Goal Blood sugar below 180 at all times recommended.  Increase protein intake / consider protein supplements - see below  Elevate extremities at all times when sitting /  laying down.    DIETARY MODIFICATIONS TO HELP WITH WOUND HEALING:    Protein: Meats, beans, eggs, milk and yogurt particularly Greek yogurt), tofu, soy nuts, soy protein products    Vitamin C: Citrus fruits and juices, strawberries, tomatoes, tomato juice, peppers, baked potatoes, spinach, broccoli, cauliflower, [place] sprouts, cabbage    Vitamin A: Dark green, leafy vegetables, orange or yellow vegetables, cantaloupe, fortified dairy products, liver, fortified cereals    Zinc: Fortified cereals, red meats, seafood    Consider Izaiah by [employer] (These are essential branch chain amino acids that help with tissue building and wound healing) and take 2 packets/day. you can order online at [employer] or [employer]    ADDITIONAL REMINDERS:    The treatment plan has been discussed at length with you and your provider. Follow all instructions carefully, it is very important. If you do not follow all instructions, you are at  risk of your wound not healing, infection, possible loss of limb and even end of life.  Please call the clinic during regular business hours ( 7:30 AM - 5:30 PM) if you notice increased bleeding, redness, warmth, pain or pus like drainage or start running a fever greater than 100.3.    For after hour emergencies, please call your primary physician or go to the nearest emergency room.    Patient/Caregiver Education: There are no barriers to learning. Medical education for above diagnosis given.   Answered all questions.    Outcome: Patient verbalizes understanding. Patient is notified to call with any questions, complications, allergies, or worsening or changing symptoms.  Patient is to call with any side effects or complications as a result of the treatments today.      DOCUMENTATION OF TIME SPENT: Code selection for this visit was based on time spent : *** min on date of service in preparing to see the patient, obtaining and/or reviewing separately obtained history, performing a medically appropriate  examination, counseling and educating the patient/family/caregiver, ordering medications or testing, referring and communicating with other healthcare providers, documenting clinical information in the E HR, independently interpreting results and communicating results to the patient/family/caregiver and care coordination with the patient's other providers.    Followup: Return in about 2 weeks (around 6/11/2025) for Wound followup.      Note to Patient:  The 21st Century Cures Act makes medical notes like these available to patients in the interest of transparency. However, be advised this is a medical document and is intended as jxvi-iz-lijx communication; it is written in medical language and may appear blunt, direct, or contain abbreviations or verbiage that are unfamiliar. Medical documents are intended to carry relevant information, facts as evident, and the clinical opinion of the practitioner.    Also, please note that this report has been produced using speech recognition software and may contain errors related to that system including, but not limited to, errors in grammar, punctuation, and spelling, as well as words and phrases that possibly may have been recognized inappropriately.  If there are any questions or concerns, contact the dictating provider for clarification.      Francesco Waldrop MD  5/28/2025  8:24 AM                      [1] No Known Allergies  [2]   Current Outpatient Medications   Medication Sig Dispense Refill    mupirocin 2 % External Ointment Apply 1 Application topically 3 (three) times daily. (Patient not taking: Reported on 5/14/2025) 15 g 1    mupirocin 2 % External Ointment Apply to affect lesions tid (Patient not taking: Reported on 5/14/2025) 30 g 1       Patient ID: Zaria Monae is a 65 year old female.    Debridement Venous Ulcer Right;Medial Ankle   Wound 05/14/25 #1 Right Medial Ankle Ankle Right;Medial    Performed by: Francesco Heredia MD  Authorized by: Benjie JACKSON  MD Francesco      Consent   Consent obtained? verbal  Consent given by: patient  Risks discussed? procedural risks discussed    Debridement Details  Performed by: physician  Debridement type: conservative sharp  Pain control: lidocaine 4%  Pain control administration type: topical    Pre-debridement measurements  Length (cm): 3.7  Width (cm): 2.5  Depth (cm): 0.1  Surface Area (cm^2): 7.26    Post-debridement measurements  Length (cm): 3.7  Width (cm): 2.5  Depth (cm): 0.1  Percent debrided: 60%  Surface Area (cm^2): 7.26  Area Debrided (cm^2): 4.36  Volume (cm^3): 0.48    Devitalized tissue debrided: biofilm and slough  Instrument(s) utilized: curette  Comment regarding bleeding: minimal  Hemostasis obtained with: pressure  Procedural pain (0-10): 5  Post-procedural pain: 1   Response to treatment: procedure was tolerated well                    Weekly Wound Education Note    Teaching Provided To: Patient  Training Topics: Cleasing and general instructions, Compression, Discharge instructions, Dressing, Edema control  Training Method: Explain/Verbal  Training Response: Patient responds and understands, Reinforcement needed        Notes: Stable. Vashe soak prior to dressing application. Betamethasone/miconazole to periwound, raheem, hydrofera transfer, kerramax, and unna boot 30-40mmhg. Pt brought in to sent to her from [facility]  only spandagrip - message sent to [employer] rep as they did not send the velcro compression also listed on the shipping list.        WOUND CARE SUPPLIES ORDERED BELOW THIS LINE  ____________________________________________________     Wound Information/Order:  Wound Number: 1   Product: NO DRESSINGS NEEDED, ONLY COMPRESSION GARMENT     Was a Debridement performed: Yes, Debridement type: mechanical     Compression Stockings ordered: Yes   Product type: Other Juzo Ulcer Pro 30-40mmhg size: III regular and spandagrip E roll  Frequency: daily  Duration: 90 days     Calf  Point of Measurement - Right  Calf: 38  Right Calf from:: Heel  Right Calf cm:: 37.5     Ankle  Point of Measurement - Right Ankle: 10  Right Ankle from:: Heel  Right Ankle cm:: 21.6     Heel to Knee   45cm     Notes: Ordering only compression garment list above, no dressings needed. Dispense as written. Please call patient before sending out order if there is any type of copay.